# Patient Record
Sex: FEMALE | Race: BLACK OR AFRICAN AMERICAN | NOT HISPANIC OR LATINO
[De-identification: names, ages, dates, MRNs, and addresses within clinical notes are randomized per-mention and may not be internally consistent; named-entity substitution may affect disease eponyms.]

---

## 2023-01-26 ENCOUNTER — APPOINTMENT (OUTPATIENT)
Dept: OBGYN | Facility: CLINIC | Age: 33
End: 2023-01-26
Payer: COMMERCIAL

## 2023-01-26 VITALS — DIASTOLIC BLOOD PRESSURE: 82 MMHG | HEART RATE: 82 BPM | OXYGEN SATURATION: 99 % | SYSTOLIC BLOOD PRESSURE: 127 MMHG

## 2023-01-26 DIAGNOSIS — Z78.9 OTHER SPECIFIED HEALTH STATUS: ICD-10-CM

## 2023-01-26 DIAGNOSIS — Z31.69 ENCOUNTER FOR OTHER GENERAL COUNSELING AND ADVICE ON PROCREATION: ICD-10-CM

## 2023-01-26 PROCEDURE — 99203 OFFICE O/P NEW LOW 30 MIN: CPT

## 2023-01-26 NOTE — PHYSICAL EXAM
[Appropriately responsive] : appropriately responsive [Alert] : alert [No Acute Distress] : no acute distress [No Lymphadenopathy] : no lymphadenopathy [Soft] : soft [Non-tender] : non-tender [Oriented x3] : oriented x3

## 2023-01-26 NOTE — HISTORY OF PRESENT ILLNESS
[Frequency: Q ___ days] : menstrual periods occur approximately every [unfilled] days [Menstrual Cramps] : menstrual cramps [Currently Active] : currently active [Men] : men [No] : No [Patient refuses STI testing] : Patient refuses STI testing [FreeTextEntry1] : 01/26/2023

## 2023-01-27 ENCOUNTER — APPOINTMENT (OUTPATIENT)
Dept: HUMAN REPRODUCTION | Facility: CLINIC | Age: 33
End: 2023-01-27
Payer: COMMERCIAL

## 2023-01-27 PROCEDURE — 36415 COLL VENOUS BLD VENIPUNCTURE: CPT

## 2023-02-02 ENCOUNTER — TRANSCRIPTION ENCOUNTER (OUTPATIENT)
Age: 33
End: 2023-02-02

## 2023-02-02 ENCOUNTER — APPOINTMENT (OUTPATIENT)
Dept: ULTRASOUND IMAGING | Facility: HOSPITAL | Age: 33
End: 2023-02-02

## 2023-02-02 ENCOUNTER — OUTPATIENT (OUTPATIENT)
Dept: OUTPATIENT SERVICES | Facility: HOSPITAL | Age: 33
LOS: 1 days | End: 2023-02-02
Payer: COMMERCIAL

## 2023-02-02 PROCEDURE — 76856 US EXAM PELVIC COMPLETE: CPT | Mod: 26

## 2023-02-02 PROCEDURE — 76830 TRANSVAGINAL US NON-OB: CPT | Mod: 26

## 2023-02-02 PROCEDURE — 76856 US EXAM PELVIC COMPLETE: CPT

## 2023-02-02 PROCEDURE — 76830 TRANSVAGINAL US NON-OB: CPT

## 2023-05-26 ENCOUNTER — APPOINTMENT (OUTPATIENT)
Dept: FAMILY MEDICINE | Facility: CLINIC | Age: 33
End: 2023-05-26
Payer: COMMERCIAL

## 2023-05-26 VITALS
TEMPERATURE: 97 F | BODY MASS INDEX: 28.12 KG/M2 | RESPIRATION RATE: 16 BRPM | WEIGHT: 175 LBS | HEART RATE: 79 BPM | HEIGHT: 66 IN | SYSTOLIC BLOOD PRESSURE: 123 MMHG | OXYGEN SATURATION: 100 % | DIASTOLIC BLOOD PRESSURE: 76 MMHG

## 2023-05-26 DIAGNOSIS — Z86.018 PERSONAL HISTORY OF OTHER BENIGN NEOPLASM: ICD-10-CM

## 2023-05-26 DIAGNOSIS — M25.562 PAIN IN LEFT KNEE: ICD-10-CM

## 2023-05-26 DIAGNOSIS — Z80.3 FAMILY HISTORY OF MALIGNANT NEOPLASM OF BREAST: ICD-10-CM

## 2023-05-26 DIAGNOSIS — N64.4 MASTODYNIA: ICD-10-CM

## 2023-05-26 DIAGNOSIS — Z82.49 FAMILY HISTORY OF ISCHEMIC HEART DISEASE AND OTHER DISEASES OF THE CIRCULATORY SYSTEM: ICD-10-CM

## 2023-05-26 DIAGNOSIS — Z78.9 OTHER SPECIFIED HEALTH STATUS: ICD-10-CM

## 2023-05-26 DIAGNOSIS — D21.9 BENIGN NEOPLASM OF CONNECTIVE AND OTHER SOFT TISSUE, UNSPECIFIED: ICD-10-CM

## 2023-05-26 DIAGNOSIS — E04.9 NONTOXIC GOITER, UNSPECIFIED: ICD-10-CM

## 2023-05-26 DIAGNOSIS — Z00.00 ENCOUNTER FOR GENERAL ADULT MEDICAL EXAMINATION W/OUT ABNORMAL FINDINGS: ICD-10-CM

## 2023-05-26 PROCEDURE — 99385 PREV VISIT NEW AGE 18-39: CPT | Mod: 25

## 2023-05-26 PROCEDURE — 36415 COLL VENOUS BLD VENIPUNCTURE: CPT

## 2023-05-26 NOTE — HEALTH RISK ASSESSMENT
[With Significant Other] : lives with significant other [# of Members in Household ___] :  household currently consist of [unfilled] member(s) [Employed] : employed [] :  [Sexually Active] : sexually active [Feels Safe at Home] : Feels safe at home [Good] : ~his/her~  mood as  good [Yes] : Yes [2 - 4 times a month (2 pts)] : 2-4 times a month (2 points) [1 or 2 (0 pts)] : 1 or 2 (0 points) [Never (0 pts)] : Never (0 points) [No] : In the past 12 months have you used drugs other than those required for medical reasons? No [HIV test declined] : HIV test declined [Hepatitis C test declined] : Hepatitis C test declined [No falls in past year] : Patient reported no falls in the past year [0] : 2) Feeling down, depressed, or hopeless: Not at all (0) [PHQ-2 Negative - No further assessment needed] : PHQ-2 Negative - No further assessment needed [None] : None [Fully functional (bathing, dressing, toileting, transferring, walking, feeding)] : Fully functional (bathing, dressing, toileting, transferring, walking, feeding) [Fully functional (using the telephone, shopping, preparing meals, housekeeping, doing laundry, using] : Fully functional and needs no help or supervision to perform IADLs (using the telephone, shopping, preparing meals, housekeeping, doing laundry, using transportation, managing medications and managing finances) [Never] : Never [Audit-CScore] : 2 [MKZ3Mrylg] : 0 [Change in mental status noted] : No change in mental status noted [PapSmearDate] : 09/22 [FreeTextEntry2] : RN

## 2023-05-26 NOTE — PAST MEDICAL HISTORY
[Menstruating] : menstruating [Approximately ___] : the LMP was approximately [unfilled] [Excessive Bleeding] : there was excessive bleeding [Dysmenorrhea] : dysmenorrhea [Total Preg ___] : G[unfilled] [Live Births ___] : P[unfilled]  [Full Term ___] : Full Term: [unfilled] [Premature ___] : Premature: [unfilled] [Abortions ___] : Abortions:[unfilled] [Living ___] : Living: [unfilled]

## 2023-05-26 NOTE — PLAN
[FreeTextEntry1] : \par \par #HM\par -CMP, CBC, Lipid, A1C, TSH w. rFT4, vitamin B12 level\par -Flu shot annually recommended\par -COVID-19 vaccination series completed\par -COVID19 booster x 1 \par -Tdap q10 years recommended- reportedly up to date \par -Use of sunscreen\par -Physical activity and healthy lifestyle recommended\par \par #Enlarged thyroid\par -US thyroid\par \par #Right breast pain\par -US breasts\par \par #Left knee pain\par -PT\par -Xray of left knee\par -F/u if no improvement in pain with PT\par \par Labs drawn in office

## 2023-05-26 NOTE — REVIEW OF SYSTEMS
[Joint Pain] : joint pain [Negative] : Heme/Lymph [Dysmenorrhea] : dysmenorrhea [Dysuria] : no dysuria [Incontinence] : no incontinence [Nocturia] : no nocturia [Poor Libido] : libido not poor [Hematuria] : no hematuria [Frequency] : no frequency [Vaginal Discharge] : no vaginal discharge [Joint Stiffness] : no joint stiffness [Joint Swelling] : no joint swelling [Muscle Weakness] : no muscle weakness [Back Pain] : no back pain [FreeTextEntry8] : menorrhagia, right breast pain  [FreeTextEntry9] : left knee pain

## 2023-05-26 NOTE — PHYSICAL EXAM
[No Acute Distress] : no acute distress [Well Nourished] : well nourished [Well Developed] : well developed [Well-Appearing] : well-appearing [Normal Sclera/Conjunctiva] : normal sclera/conjunctiva [PERRL] : pupils equal round and reactive to light [EOMI] : extraocular movements intact [Normal Outer Ear/Nose] : the outer ears and nose were normal in appearance [Normal Oropharynx] : the oropharynx was normal [Normal TMs] : both tympanic membranes were normal [No JVD] : no jugular venous distention [No Lymphadenopathy] : no lymphadenopathy [Supple] : supple [No Respiratory Distress] : no respiratory distress  [No Accessory Muscle Use] : no accessory muscle use [Clear to Auscultation] : lungs were clear to auscultation bilaterally [Normal Rate] : normal rate  [Regular Rhythm] : with a regular rhythm [Normal S1, S2] : normal S1 and S2 [No Murmur] : no murmur heard [No Abdominal Bruit] : a ~M bruit was not heard ~T in the abdomen [No Varicosities] : no varicosities [Pedal Pulses Present] : the pedal pulses are present [No Edema] : there was no peripheral edema [No Palpable Aorta] : no palpable aorta [No Extremity Clubbing/Cyanosis] : no extremity clubbing/cyanosis [Normal Appearance] : normal in appearance [No Nipple Discharge] : no nipple discharge [No Axillary Lymphadenopathy] : no axillary lymphadenopathy [Soft] : abdomen soft [Non Tender] : non-tender [Non-distended] : non-distended [No Masses] : no abdominal mass palpated [No HSM] : no HSM [Normal Bowel Sounds] : normal bowel sounds [Normal Supraclavicular Nodes] : no supraclavicular lymphadenopathy [Normal Axillary Nodes] : no axillary lymphadenopathy [Normal Posterior Cervical Nodes] : no posterior cervical lymphadenopathy [Normal Anterior Cervical Nodes] : no anterior cervical lymphadenopathy [No CVA Tenderness] : no CVA  tenderness [No Spinal Tenderness] : no spinal tenderness [No Joint Swelling] : no joint swelling [Grossly Normal Strength/Tone] : grossly normal strength/tone [No Rash] : no rash [Coordination Grossly Intact] : coordination grossly intact [No Focal Deficits] : no focal deficits [Normal Gait] : normal gait [Speech Grossly Normal] : speech grossly normal [Normal Affect] : the affect was normal [Normal Mood] : the mood was normal [Normal Insight/Judgement] : insight and judgment were intact [de-identified] : mildly enlarged thyroid [de-identified] : \par (chaperone offered for exam today, pt refused) dense breasts  [de-identified] : no laxity of left knee and no tenderness with palpation

## 2023-05-26 NOTE — HISTORY OF PRESENT ILLNESS
[FreeTextEntry1] : comprehensive physical exam. \par chronic left knee pain [de-identified] :  34 y/o female presents today for comprehensive physical exam. \par \par Pt also c/o atraumatic left knee pain x 3 months that worsens with physical activity. Pt states pain started after doing lunges.

## 2023-05-29 LAB
ALBUMIN SERPL ELPH-MCNC: 4.7 G/DL
ALP BLD-CCNC: 52 U/L
ALT SERPL-CCNC: 16 U/L
ANION GAP SERPL CALC-SCNC: 12 MMOL/L
AST SERPL-CCNC: 17 U/L
BILIRUB SERPL-MCNC: 0.3 MG/DL
BUN SERPL-MCNC: 12 MG/DL
CALCIUM SERPL-MCNC: 10.4 MG/DL
CHLORIDE SERPL-SCNC: 103 MMOL/L
CHOLEST SERPL-MCNC: 169 MG/DL
CO2 SERPL-SCNC: 25 MMOL/L
CREAT SERPL-MCNC: 0.79 MG/DL
EGFR: 101 ML/MIN/1.73M2
ESTIMATED AVERAGE GLUCOSE: 111 MG/DL
GLUCOSE SERPL-MCNC: 84 MG/DL
HBA1C MFR BLD HPLC: 5.5 %
HDLC SERPL-MCNC: 63 MG/DL
LDLC SERPL CALC-MCNC: 86 MG/DL
NONHDLC SERPL-MCNC: 105 MG/DL
POTASSIUM SERPL-SCNC: 4.7 MMOL/L
PROT SERPL-MCNC: 7.1 G/DL
SODIUM SERPL-SCNC: 140 MMOL/L
TRIGL SERPL-MCNC: 95 MG/DL
TSH SERPL-ACNC: 1.3 UIU/ML
VIT B12 SERPL-MCNC: 1083 PG/ML

## 2023-09-14 ENCOUNTER — APPOINTMENT (OUTPATIENT)
Dept: HUMAN REPRODUCTION | Facility: CLINIC | Age: 33
End: 2023-09-14

## 2023-10-02 ENCOUNTER — APPOINTMENT (OUTPATIENT)
Dept: HUMAN REPRODUCTION | Facility: CLINIC | Age: 33
End: 2023-10-02
Payer: COMMERCIAL

## 2023-10-02 PROCEDURE — 76830 TRANSVAGINAL US NON-OB: CPT

## 2023-10-02 PROCEDURE — 99214 OFFICE O/P EST MOD 30 MIN: CPT | Mod: 25

## 2023-10-02 PROCEDURE — 36415 COLL VENOUS BLD VENIPUNCTURE: CPT

## 2023-10-05 ENCOUNTER — APPOINTMENT (OUTPATIENT)
Dept: RADIOLOGY | Facility: CLINIC | Age: 33
End: 2023-10-05

## 2023-10-05 ENCOUNTER — APPOINTMENT (OUTPATIENT)
Dept: ULTRASOUND IMAGING | Facility: CLINIC | Age: 33
End: 2023-10-05

## 2023-10-30 ENCOUNTER — APPOINTMENT (OUTPATIENT)
Dept: HUMAN REPRODUCTION | Facility: CLINIC | Age: 33
End: 2023-10-30
Payer: COMMERCIAL

## 2023-10-30 PROCEDURE — 36415 COLL VENOUS BLD VENIPUNCTURE: CPT

## 2023-10-31 ENCOUNTER — APPOINTMENT (OUTPATIENT)
Dept: RADIOLOGY | Facility: CLINIC | Age: 33
End: 2023-10-31
Payer: COMMERCIAL

## 2023-10-31 PROCEDURE — 58340 CATHETER FOR HYSTEROGRAPHY: CPT

## 2023-10-31 PROCEDURE — 74740 X-RAY FEMALE GENITAL TRACT: CPT

## 2023-11-09 ENCOUNTER — APPOINTMENT (OUTPATIENT)
Dept: HUMAN REPRODUCTION | Facility: CLINIC | Age: 33
End: 2023-11-09
Payer: COMMERCIAL

## 2023-11-09 PROCEDURE — 76998 US GUIDE INTRAOP: CPT

## 2023-11-09 PROCEDURE — 58558Z: CUSTOM

## 2023-12-07 ENCOUNTER — APPOINTMENT (OUTPATIENT)
Dept: FAMILY MEDICINE | Facility: CLINIC | Age: 33
End: 2023-12-07
Payer: COMMERCIAL

## 2023-12-07 DIAGNOSIS — Z32.01 ENCOUNTER FOR PREGNANCY TEST, RESULT POSITIVE: ICD-10-CM

## 2023-12-07 DIAGNOSIS — R03.0 ELEVATED BLOOD-PRESSURE READING, W/OUT DIAGNOSIS OF HYPERTENSION: ICD-10-CM

## 2023-12-07 PROCEDURE — 99446 NTRPROF PH1/NTRNET/EHR 5-10: CPT

## 2023-12-12 PROBLEM — Z32.01 POSITIVE URINE PREGNANCY TEST: Status: ACTIVE | Noted: 2023-12-09

## 2023-12-12 PROBLEM — R03.0 ELEVATED BP WITHOUT DIAGNOSIS OF HYPERTENSION: Status: ACTIVE | Noted: 2023-12-12

## 2024-01-05 ENCOUNTER — APPOINTMENT (OUTPATIENT)
Dept: OBGYN | Facility: CLINIC | Age: 34
End: 2024-01-05
Payer: COMMERCIAL

## 2024-01-05 VITALS
WEIGHT: 185 LBS | DIASTOLIC BLOOD PRESSURE: 75 MMHG | OXYGEN SATURATION: 100 % | HEART RATE: 85 BPM | SYSTOLIC BLOOD PRESSURE: 148 MMHG | BODY MASS INDEX: 29.86 KG/M2

## 2024-01-05 PROCEDURE — 99202 OFFICE O/P NEW SF 15 MIN: CPT

## 2024-01-05 PROCEDURE — 36415 COLL VENOUS BLD VENIPUNCTURE: CPT

## 2024-01-05 PROCEDURE — 76830 TRANSVAGINAL US NON-OB: CPT

## 2024-01-10 LAB
ABO + RH PNL BLD: NORMAL
ALBUMIN SERPL ELPH-MCNC: 4.2 G/DL
ALP BLD-CCNC: 49 U/L
ALT SERPL-CCNC: 12 U/L
ANION GAP SERPL CALC-SCNC: 12 MMOL/L
AST SERPL-CCNC: 14 U/L
B19V IGG SER QL IA: NEGATIVE
B19V IGG+IGM SER-IMP: NORMAL
B19V IGM FLD-ACNC: NEGATIVE
BACTERIA UR CULT: NORMAL
BILIRUB SERPL-MCNC: 0.3 MG/DL
BLD GP AB SCN SERPL QL: NORMAL
BUN SERPL-MCNC: 8 MG/DL
C TRACH RRNA SPEC QL NAA+PROBE: NOT DETECTED
CALCIUM SERPL-MCNC: 9.9 MG/DL
CANDIDA VAG CYTO: NOT DETECTED
CHLORIDE SERPL-SCNC: 103 MMOL/L
CMV IGG SERPL QL: 0.25 U/ML
CMV IGG SERPL-IMP: NEGATIVE
CMV IGM SERPL QL: <8 AU/ML
CMV IGM SERPL QL: NEGATIVE
CO2 SERPL-SCNC: 22 MMOL/L
CREAT SERPL-MCNC: 0.61 MG/DL
CYTOMEGALOVIRUS ABS IGM: <8 AU/ML
EGFR: 120 ML/MIN/1.73M2
G VAGINALIS+PREV SP MTYP VAG QL MICRO: NOT DETECTED
GLUCOSE SERPL-MCNC: 112 MG/DL
HBV SURFACE AG SER QL: NONREACTIVE
HCV AB SER QL: NONREACTIVE
HCV S/CO RATIO: 0.09 S/CO
HERPES SIMPLEX 1 AND 2 ABS IGM: 1.52 IV
HGB A MFR BLD: 97.4 %
HGB A2 MFR BLD: 2.6 %
HGB FRACT BLD-IMP: NORMAL
HIV1+2 AB SPEC QL IA.RAPID: NONREACTIVE
MEV IGG FLD QL IA: 71.5 AU/ML
MEV IGG+IGM SER-IMP: POSITIVE
MUV AB SER-ACNC: POSITIVE
MUV IGG SER QL IA: 159 AU/ML
N GONORRHOEA RRNA SPEC QL NAA+PROBE: NOT DETECTED
POTASSIUM SERPL-SCNC: 4 MMOL/L
PROT SERPL-MCNC: 6.6 G/DL
RUBV IGG FLD-ACNC: 3.5 INDEX
RUBV IGG SER-IMP: POSITIVE
RUBV IGM FLD-ACNC: <10 AU/ML
SODIUM SERPL-SCNC: 137 MMOL/L
SOURCE AMPLIFICATION: NORMAL
T GONDII AB SER-IMP: NEGATIVE
T GONDII AB SER-IMP: NEGATIVE
T GONDII IGG SER QL: <3 IU/ML
T GONDII IGM SER QL: <3 AU/ML
T PALLIDUM AB SER QL IA: NEGATIVE
T VAGINALIS VAG QL WET PREP: NOT DETECTED
TOXOPLASMA GONDII ABS IGM: <3 AU/ML
TSH SERPL-ACNC: 0.48 UIU/ML
VZV AB TITR SER: POSITIVE
VZV IGG SER IF-ACNC: 657.1 INDEX

## 2024-01-24 ENCOUNTER — APPOINTMENT (OUTPATIENT)
Dept: OBGYN | Facility: CLINIC | Age: 34
End: 2024-01-24
Payer: COMMERCIAL

## 2024-01-24 VITALS
WEIGHT: 184 LBS | HEART RATE: 90 BPM | SYSTOLIC BLOOD PRESSURE: 154 MMHG | OXYGEN SATURATION: 99 % | BODY MASS INDEX: 29.7 KG/M2 | DIASTOLIC BLOOD PRESSURE: 83 MMHG

## 2024-01-24 PROCEDURE — 0502F SUBSEQUENT PRENATAL CARE: CPT

## 2024-02-08 ENCOUNTER — LABORATORY RESULT (OUTPATIENT)
Age: 34
End: 2024-02-08

## 2024-02-08 ENCOUNTER — APPOINTMENT (OUTPATIENT)
Dept: ANTEPARTUM | Facility: CLINIC | Age: 34
End: 2024-02-08
Payer: COMMERCIAL

## 2024-02-08 ENCOUNTER — ASOB RESULT (OUTPATIENT)
Age: 34
End: 2024-02-08

## 2024-02-08 PROCEDURE — 36415 COLL VENOUS BLD VENIPUNCTURE: CPT

## 2024-02-08 PROCEDURE — 76813 OB US NUCHAL MEAS 1 GEST: CPT

## 2024-02-08 PROCEDURE — 93976 VASCULAR STUDY: CPT

## 2024-02-15 ENCOUNTER — NON-APPOINTMENT (OUTPATIENT)
Age: 34
End: 2024-02-15

## 2024-02-15 LAB
AR GENE MUT ANL BLD/T: NORMAL
CFTR MUT TESTED BLD/T: NEGATIVE
FMR1 GENE MUT ANL BLD/T: NORMAL

## 2024-02-19 NOTE — REVIEW OF SYSTEMS
[Fatigue] : fatigue [Poor Appetite] : poor appetite [Vomiting] : no vomiting [Nausea] : nausea [Breast Pain] : breast pain [Negative] : Heme/Lymph

## 2024-02-19 NOTE — HISTORY OF PRESENT ILLNESS
[Patient reported PAP Smear was normal] : Patient reported PAP Smear was normal [Normal Amount/Duration] :  normal amount and duration [No] : Patient does not have concerns regarding sex [Currently Active] : currently active [Men] : men [PapSmeardate] : 2023 [TextBox_31] : as per pt [FreeTextEntry1] : 11/12/2023

## 2024-02-19 NOTE — PROCEDURE
[Cervical Pap Smear] : cervical Pap smear [Liquid Base] : liquid base [GC Chlamydia Culture] : GC Chlamydia Culture [Affirm (Triple Culture)] : Affirm (triple culture) [Tolerated Well] : the patient tolerated the procedure well [No Complications] : there were no complications [Transvaginal OB Sonogram] : Transvaginal OB Sonogram [Intrauterine Pregnancy] : intrauterine pregnancy [Yolk Sac] : yolk sac present [Fetal Heart] : fetal heart present [CRL: ___ (mm)] : CRL - [unfilled]Umm [Date: ___] : Date: [unfilled] [Current GA by Sonogram: ___ (wks)] : Current GA by Sonogram: [unfilled]Uwks [___ day(s)] : [unfilled] days [Transvaginal OB Sonogram WNL] : Transvaginal OB Sonogram WNL [FreeTextEntry1] : (+) FHR @ 158 bpm

## 2024-02-19 NOTE — PHYSICAL EXAM
[Chaperone Present] : A chaperone was present in the examining room during all aspects of the physical examination [Appropriately responsive] : appropriately responsive [Alert] : alert [No Acute Distress] : no acute distress [No Lymphadenopathy] : no lymphadenopathy [No Murmurs] : no murmurs [Regular Rate Rhythm] : regular rate rhythm [Clear to Auscultation B/L] : clear to auscultation bilaterally [Soft] : soft [Non-tender] : non-tender [Non-distended] : non-distended [No HSM] : No HSM [No Lesions] : no lesions [No Mass] : no mass [Oriented x3] : oriented x3 [Examination Of The Breasts] : a normal appearance [Breast Palpation Diffuse Fibrous Tissue Bilateral] : fibrocystic changes [No Discharge] : no discharge [No Masses] : no breast masses were palpable [Labia Minora] : normal [Labia Majora] : normal [Normal] : normal [Enlarged ___ wks] : enlarged [unfilled] ~Uweeks [FreeTextEntry5] : l/c/p [Uterine Adnexae] : normal

## 2024-02-21 ENCOUNTER — APPOINTMENT (OUTPATIENT)
Dept: OBGYN | Facility: CLINIC | Age: 34
End: 2024-02-21
Payer: COMMERCIAL

## 2024-02-21 PROCEDURE — 0502F SUBSEQUENT PRENATAL CARE: CPT

## 2024-02-22 ENCOUNTER — NON-APPOINTMENT (OUTPATIENT)
Age: 34
End: 2024-02-22

## 2024-02-26 LAB
CREAT 24H UR-MCNC: 1.4 G/24 H
CREAT ?TM UR-MCNC: 58 MG/DL
PROT 24H UR-MRATE: 13 MG/DL
PROT ?TM UR-MCNC: 24 HR
PROT UR-MCNC: 315 MG/24 H
SPECIMEN VOL 24H UR: 2425 ML

## 2024-02-27 RX ORDER — LABETALOL HYDROCHLORIDE 100 MG/1
100 TABLET, FILM COATED ORAL
Qty: 180 | Refills: 0 | Status: ACTIVE | COMMUNITY
Start: 2024-02-22 | End: 1900-01-01

## 2024-03-01 ENCOUNTER — NON-APPOINTMENT (OUTPATIENT)
Age: 34
End: 2024-03-01

## 2024-03-08 ENCOUNTER — ASOB RESULT (OUTPATIENT)
Age: 34
End: 2024-03-08

## 2024-03-08 ENCOUNTER — APPOINTMENT (OUTPATIENT)
Dept: ANTEPARTUM | Facility: CLINIC | Age: 34
End: 2024-03-08
Payer: COMMERCIAL

## 2024-03-08 PROCEDURE — 76805 OB US >/= 14 WKS SNGL FETUS: CPT

## 2024-03-08 PROCEDURE — 76817 TRANSVAGINAL US OBSTETRIC: CPT

## 2024-03-13 ENCOUNTER — APPOINTMENT (OUTPATIENT)
Dept: OBGYN | Facility: CLINIC | Age: 34
End: 2024-03-13
Payer: COMMERCIAL

## 2024-03-13 DIAGNOSIS — R00.2 PALPITATIONS: ICD-10-CM

## 2024-03-13 PROCEDURE — 36415 COLL VENOUS BLD VENIPUNCTURE: CPT

## 2024-03-13 PROCEDURE — 0502F SUBSEQUENT PRENATAL CARE: CPT

## 2024-04-03 ENCOUNTER — APPOINTMENT (OUTPATIENT)
Dept: OBGYN | Facility: CLINIC | Age: 34
End: 2024-04-03
Payer: COMMERCIAL

## 2024-04-03 DIAGNOSIS — I10 ESSENTIAL (PRIMARY) HYPERTENSION: ICD-10-CM

## 2024-04-03 LAB
AFP MOM: 1.65
AFP VALUE: 63.4 NG/ML
ALPHA FETOPROTEIN SERUM COMMENT: NORMAL
ALPHA FETOPROTEIN SERUM INTERPRETATION: NORMAL
ALPHA FETOPROTEIN SERUM RESULTS: NORMAL
ALPHA FETOPROTEIN SERUM TEST RESULTS: NORMAL
ANION GAP SERPL CALC-SCNC: 10 MMOL/L
BUN SERPL-MCNC: 7 MG/DL
CALCIUM SERPL-MCNC: 9.1 MG/DL
CHLORIDE SERPL-SCNC: 102 MMOL/L
CO2 SERPL-SCNC: 22 MMOL/L
CREAT SERPL-MCNC: 0.47 MG/DL
EGFR: 128 ML/MIN/1.73M2
GESTATIONAL AGE BASED ON: NORMAL
GESTATIONAL AGE ON COLLECTION DATE: 17.3 WEEKS
GLUCOSE SERPL-MCNC: 89 MG/DL
INSULIN DEP DIABETES: NO
MATERNAL AGE AT EDD AFP: 35.2 YR
MULTIPLE GESTATION: NO
OSBR RISK 1 IN: 3696
POTASSIUM SERPL-SCNC: 3.9 MMOL/L
RACE: NORMAL
SODIUM SERPL-SCNC: 135 MMOL/L
WEIGHT AFP: 187 LBS

## 2024-04-03 PROCEDURE — 0502F SUBSEQUENT PRENATAL CARE: CPT

## 2024-04-03 RX ORDER — LABETALOL HYDROCHLORIDE 200 MG/1
200 TABLET, FILM COATED ORAL 3 TIMES DAILY
Qty: 90 | Refills: 2 | Status: DISCONTINUED | COMMUNITY
Start: 2024-04-03 | End: 2024-04-03

## 2024-04-03 RX ORDER — LABETALOL HYDROCHLORIDE 200 MG/1
200 TABLET, FILM COATED ORAL 3 TIMES DAILY
Qty: 180 | Refills: 2 | Status: ACTIVE | COMMUNITY
Start: 2024-04-03 | End: 1900-01-01

## 2024-04-10 ENCOUNTER — APPOINTMENT (OUTPATIENT)
Dept: ANTEPARTUM | Facility: CLINIC | Age: 34
End: 2024-04-10
Payer: COMMERCIAL

## 2024-04-10 ENCOUNTER — ASOB RESULT (OUTPATIENT)
Age: 34
End: 2024-04-10

## 2024-04-10 PROCEDURE — 76811 OB US DETAILED SNGL FETUS: CPT

## 2024-04-10 PROCEDURE — 76817 TRANSVAGINAL US OBSTETRIC: CPT

## 2024-04-11 ENCOUNTER — NON-APPOINTMENT (OUTPATIENT)
Age: 34
End: 2024-04-11

## 2024-04-17 ENCOUNTER — ASOB RESULT (OUTPATIENT)
Age: 34
End: 2024-04-17

## 2024-04-17 ENCOUNTER — APPOINTMENT (OUTPATIENT)
Dept: ANTEPARTUM | Facility: CLINIC | Age: 34
End: 2024-04-17
Payer: COMMERCIAL

## 2024-04-17 PROCEDURE — 76816 OB US FOLLOW-UP PER FETUS: CPT

## 2024-04-24 ENCOUNTER — OUTPATIENT (OUTPATIENT)
Dept: OUTPATIENT SERVICES | Facility: HOSPITAL | Age: 34
LOS: 1 days | End: 2024-04-24
Payer: COMMERCIAL

## 2024-04-24 VITALS
DIASTOLIC BLOOD PRESSURE: 69 MMHG | SYSTOLIC BLOOD PRESSURE: 142 MMHG | OXYGEN SATURATION: 100 % | TEMPERATURE: 99 F | RESPIRATION RATE: 17 BRPM | HEART RATE: 76 BPM

## 2024-04-24 DIAGNOSIS — O26.899 OTHER SPECIFIED PREGNANCY RELATED CONDITIONS, UNSPECIFIED TRIMESTER: ICD-10-CM

## 2024-04-24 PROCEDURE — 99214 OFFICE O/P EST MOD 30 MIN: CPT

## 2024-04-24 PROCEDURE — 96360 HYDRATION IV INFUSION INIT: CPT

## 2024-04-24 PROCEDURE — 99213 OFFICE O/P EST LOW 20 MIN: CPT | Mod: 25

## 2024-04-24 PROCEDURE — 76815 OB US LIMITED FETUS(S): CPT | Mod: 26

## 2024-04-24 PROCEDURE — 76815 OB US LIMITED FETUS(S): CPT

## 2024-04-24 PROCEDURE — 59025 FETAL NON-STRESS TEST: CPT | Mod: 26

## 2024-04-24 PROCEDURE — 59025 FETAL NON-STRESS TEST: CPT

## 2024-04-24 RX ORDER — SODIUM CHLORIDE 9 MG/ML
1000 INJECTION, SOLUTION INTRAVENOUS ONCE
Refills: 0 | Status: COMPLETED | OUTPATIENT
Start: 2024-04-24 | End: 2024-04-24

## 2024-04-24 RX ORDER — LABETALOL HCL 100 MG
200 TABLET ORAL ONCE
Refills: 0 | Status: COMPLETED | OUTPATIENT
Start: 2024-04-24 | End: 2024-04-24

## 2024-04-24 RX ADMIN — SODIUM CHLORIDE 1000 MILLILITER(S): 9 INJECTION, SOLUTION INTRAVENOUS at 07:30

## 2024-04-24 NOTE — OB PROVIDER TRIAGE NOTE - NSHPPHYSICALEXAM_GEN_ALL_CORE
US: +FH +FM CHARLEE 9 transverse head to maternal L, anterior placenta  VE: c/l/p, Speculum: closed, no fluids   TVUS: cervical length 4.1  GEN NAD A&ox3  ABD; soft nontender no ctx felt   toco: no ctx seen   NST reassuring reactive   LE no edema/pitting US: +FH +FM CHARLEE 9 transverse head to maternal L, anterior placenta  VE: c/l/p, Speculum: closed, no fluids   TVUS: cervical length 4.1  GEN NAD A&ox3  ABD; soft nontender no ctx felt   toco: no ctx seen   NST reassuring reactive   LE no edema/pitting  Lungs clear on auscultation

## 2024-04-24 NOTE — OB PROVIDER TRIAGE NOTE - NSOBPROVIDERNOTE_OBGYN_ALL_OB_FT
32yo  at 23w3d presents complaining of abdominal cramping every 10 minutes since yesterday   -No ctx on monitor. None felt. Pt feeling better after given IL of IVF   -Encouraged hydration and rest after traveling   -FH+ FM+   -Pt given strict return precautions and advised to come back if pt feels any more pain   -Dr Galvan in agreement w/ plan for d/c 32yo  at 23w3d presents complaining of abdominal cramping every 10 minutes since yesterday   -No ctx on monitor. None felt. Pt feeling better after given IL of IVF   -Encouraged hydration and rest after traveling   -FH+ FM+ reassuring Fetal status   -chtn known to us, mild BP in triage, given Labetalol 200 since pt missed her 7am dose. now normotensive.   -Pt given strict return precautions and advised to come back if pt feels any more pain   -Dr Galvan in agreement w/ plan for d/c

## 2024-04-24 NOTE — OB RN TRIAGE NOTE - NSNURSINGINSTR_OBGYN_ALL_OB_FT
Patient discharged to home; patient provided with discharge instructions by CRAIG Sullivan. Patient verbally indicates understanding of discharge instructions; vital signs wnl. patient ambulated off unit in stable condition.

## 2024-04-24 NOTE — OB PROVIDER TRIAGE NOTE - HISTORY OF PRESENT ILLNESS
[id number] 34yo  at 23w3d presents to triage complaining of abdominal cramping every 10 mins x1d.  Patient states [place] was [place] and started to feel abdominal tightening 2d ago and then yesterday she felt cramping every 10 minutes.   Patient states she just landed from  and came straight here.  Patient states throughout her travels shes been trying to hydrate but doesnt know if it was enough. Denies current severe abdominal pain, LOF, VB. +FM     ANTE: anatomy scan wnl. XY.  cHTN started labetalol 200 TID 1st trimester. ASA qd for high risk.     OBHX G1 current  GYN  3 fibroids unk size  PMHX   chtn   PSHX   inguinal hernia repair w/ mesh 01, hysteroscopic polypectomy 14,16,23  MEDS   ASA qd, Labetalol 200 TID   ALL  NKDA     34yo  at 23w3d presents to triage complaining of abdominal cramping every 10 mins x1d.  Patient [place] and started to feel abdominal tightening 2d ago and then yesterday she felt cramping every 10 minutes.   Patient states she just landed from  and came straight here.  Patient states throughout her travels shes been trying to hydrate but doesnt know if it was enough. Denies current severe abdominal pain, LOF, VB. +FM Denies HA, blurred vision, vision changes, RUQ pain, CP, SOB, LE edema.     ANTE: anatomy scan wnl. XY.  cHTN started labetalol 200 TID 1st trimester. ASA qd for high risk.     OBHX G1 current  GYN  3 fibroids unk size  PMHX   chtn   PSHX   inguinal hernia repair w/ mesh 01, hysteroscopic polypectomy 14,16,23  MEDS   ASA qd, Labetalol 200 TID   ALL  NKDA

## 2024-04-24 NOTE — OB RN TRIAGE NOTE - FALL HARM RISK - UNIVERSAL INTERVENTIONS
Bed in lowest position, wheels locked, appropriate side rails in place/Call bell, personal items and telephone in reach/Instruct patient to call for assistance before getting out of bed or chair/Non-slip footwear when patient is out of bed/[place] to call system/Physically safe environment - no spills, clutter or unnecessary equipment/Purposeful Proactive Rounding/Room/bathroom lighting operational, light cord in reach

## 2024-04-25 DIAGNOSIS — O26.892 OTHER SPECIFIED PREGNANCY RELATED CONDITIONS, SECOND TRIMESTER: ICD-10-CM

## 2024-04-25 DIAGNOSIS — O99.891 OTHER SPECIFIED DISEASES AND CONDITIONS COMPLICATING PREGNANCY: ICD-10-CM

## 2024-04-25 DIAGNOSIS — Z3A.23 23 WEEKS GESTATION OF PREGNANCY: ICD-10-CM

## 2024-04-25 DIAGNOSIS — D21.9 BENIGN NEOPLASM OF CONNECTIVE AND OTHER SOFT TISSUE, UNSPECIFIED: ICD-10-CM

## 2024-04-25 DIAGNOSIS — O10.912 UNSPECIFIED PRE-EXISTING HYPERTENSION COMPLICATING PREGNANCY, SECOND TRIMESTER: ICD-10-CM

## 2024-04-25 DIAGNOSIS — R10.9 UNSPECIFIED ABDOMINAL PAIN: ICD-10-CM

## 2024-04-25 PROBLEM — I10 ESSENTIAL (PRIMARY) HYPERTENSION: Chronic | Status: ACTIVE | Noted: 2024-04-24

## 2024-04-26 ENCOUNTER — NON-APPOINTMENT (OUTPATIENT)
Age: 34
End: 2024-04-26

## 2024-04-26 ENCOUNTER — APPOINTMENT (OUTPATIENT)
Dept: OBGYN | Facility: CLINIC | Age: 34
End: 2024-04-26

## 2024-04-26 LAB
CREAT SPEC-SCNC: 67 MG/DL
CREAT/PROT UR: 0.1 RATIO
PROT UR-MCNC: 8 MG/DL

## 2024-04-26 PROCEDURE — 0502F SUBSEQUENT PRENATAL CARE: CPT

## 2024-04-30 ENCOUNTER — NON-APPOINTMENT (OUTPATIENT)
Age: 34
End: 2024-04-30

## 2024-05-06 ENCOUNTER — NON-APPOINTMENT (OUTPATIENT)
Age: 34
End: 2024-05-06

## 2024-05-13 ENCOUNTER — NON-APPOINTMENT (OUTPATIENT)
Age: 34
End: 2024-05-13

## 2024-05-14 ENCOUNTER — LABORATORY RESULT (OUTPATIENT)
Age: 34
End: 2024-05-14

## 2024-05-14 ENCOUNTER — APPOINTMENT (OUTPATIENT)
Dept: OBGYN | Facility: CLINIC | Age: 34
End: 2024-05-14
Payer: COMMERCIAL

## 2024-05-14 DIAGNOSIS — O09.519 SUPERVISION OF ELDERLY PRIMIGRAVIDA, UNSPECIFIED TRIMESTER: ICD-10-CM

## 2024-05-14 PROCEDURE — 36415 COLL VENOUS BLD VENIPUNCTURE: CPT

## 2024-05-15 ENCOUNTER — NON-APPOINTMENT (OUTPATIENT)
Age: 34
End: 2024-05-15

## 2024-05-15 LAB
GLUCOSE 1H P 50 G GLC PO SERPL-MCNC: 114 MG/DL
T PALLIDUM AB SER QL IA: NEGATIVE

## 2024-05-20 ENCOUNTER — NON-APPOINTMENT (OUTPATIENT)
Age: 34
End: 2024-05-20

## 2024-05-28 ENCOUNTER — APPOINTMENT (OUTPATIENT)
Dept: OBGYN | Facility: CLINIC | Age: 34
End: 2024-05-28
Payer: COMMERCIAL

## 2024-05-28 DIAGNOSIS — Z34.90 ENCOUNTER FOR SUPERVISION OF NORMAL PREGNANCY, UNSPECIFIED, UNSPECIFIED TRIMESTER: ICD-10-CM

## 2024-05-28 PROCEDURE — 36415 COLL VENOUS BLD VENIPUNCTURE: CPT

## 2024-05-29 ENCOUNTER — APPOINTMENT (OUTPATIENT)
Dept: OBGYN | Facility: CLINIC | Age: 34
End: 2024-05-29

## 2024-05-29 ENCOUNTER — NON-APPOINTMENT (OUTPATIENT)
Age: 34
End: 2024-05-29

## 2024-05-29 LAB
ALBUMIN SERPL ELPH-MCNC: 3.8 G/DL
ALP BLD-CCNC: 70 U/L
ALT SERPL-CCNC: 18 U/L
AST SERPL-CCNC: 14 U/L
BILIRUB DIRECT SERPL-MCNC: 0 MG/DL
BILIRUB INDIRECT SERPL-MCNC: NORMAL MG/DL
BILIRUB SERPL-MCNC: <0.2 MG/DL
PROT SERPL-MCNC: 6.1 G/DL

## 2024-05-30 ENCOUNTER — NON-APPOINTMENT (OUTPATIENT)
Age: 34
End: 2024-05-30

## 2024-05-30 LAB
APPEARANCE: CLEAR
BILIRUBIN URINE: NEGATIVE
BLOOD URINE: NEGATIVE
COLOR: YELLOW
GLUCOSE QUALITATIVE U: NEGATIVE MG/DL
KETONES URINE: NEGATIVE MG/DL
LEUKOCYTE ESTERASE URINE: ABNORMAL
NITRITE URINE: NEGATIVE
PH URINE: 7
PROTEIN URINE: NEGATIVE MG/DL
SPECIFIC GRAVITY URINE: 1
UROBILINOGEN URINE: 0.2 MG/DL

## 2024-05-31 ENCOUNTER — NON-APPOINTMENT (OUTPATIENT)
Age: 34
End: 2024-05-31

## 2024-05-31 DIAGNOSIS — O26.643 INTRAHEPATIC CHOLESTASIS OF PREGNANCY, THIRD TRIMESTER: ICD-10-CM

## 2024-05-31 LAB — BILE AC SER-MCNC: 27.2 UMOL/L

## 2024-05-31 RX ORDER — URSODIOL 300 MG/1
300 CAPSULE ORAL
Qty: 60 | Refills: 1 | Status: ACTIVE | COMMUNITY
Start: 2024-05-31 | End: 1900-01-01

## 2024-06-03 ENCOUNTER — APPOINTMENT (OUTPATIENT)
Dept: ANTEPARTUM | Facility: CLINIC | Age: 34
End: 2024-06-03
Payer: COMMERCIAL

## 2024-06-03 ENCOUNTER — ASOB RESULT (OUTPATIENT)
Age: 34
End: 2024-06-03

## 2024-06-03 PROCEDURE — 76816 OB US FOLLOW-UP PER FETUS: CPT

## 2024-06-03 PROCEDURE — 76818 FETAL BIOPHYS PROFILE W/NST: CPT

## 2024-06-03 PROCEDURE — 76820 UMBILICAL ARTERY ECHO: CPT | Mod: 59

## 2024-06-05 ENCOUNTER — NON-APPOINTMENT (OUTPATIENT)
Age: 34
End: 2024-06-05

## 2024-06-05 ENCOUNTER — APPOINTMENT (OUTPATIENT)
Dept: OBGYN | Facility: CLINIC | Age: 34
End: 2024-06-05

## 2024-06-05 LAB
HCT VFR BLD CALC: 35.7 %
HGB BLD-MCNC: 11.1 G/DL
MCHC RBC-ENTMCNC: 28.8 PG
MCHC RBC-ENTMCNC: 31.1 GM/DL
MCV RBC AUTO: 92.5 FL
PLATELET # BLD AUTO: 235 K/UL
RBC # BLD: 3.86 M/UL
RBC # FLD: 14.9 %
WBC # FLD AUTO: 8.69 K/UL

## 2024-06-05 PROCEDURE — 0502F SUBSEQUENT PRENATAL CARE: CPT

## 2024-06-09 ENCOUNTER — NON-APPOINTMENT (OUTPATIENT)
Age: 34
End: 2024-06-09

## 2024-06-12 ENCOUNTER — APPOINTMENT (OUTPATIENT)
Dept: ANTEPARTUM | Facility: CLINIC | Age: 34
End: 2024-06-12
Payer: COMMERCIAL

## 2024-06-12 ENCOUNTER — ASOB RESULT (OUTPATIENT)
Age: 34
End: 2024-06-12

## 2024-06-12 PROCEDURE — 76818 FETAL BIOPHYS PROFILE W/NST: CPT

## 2024-06-12 PROCEDURE — 76820 UMBILICAL ARTERY ECHO: CPT

## 2024-06-12 PROCEDURE — 76815 OB US LIMITED FETUS(S): CPT

## 2024-06-14 ENCOUNTER — ASOB RESULT (OUTPATIENT)
Age: 34
End: 2024-06-14

## 2024-06-14 ENCOUNTER — APPOINTMENT (OUTPATIENT)
Dept: ANTEPARTUM | Facility: CLINIC | Age: 34
End: 2024-06-14
Payer: COMMERCIAL

## 2024-06-14 PROCEDURE — 76815 OB US LIMITED FETUS(S): CPT

## 2024-06-14 PROCEDURE — 76819 FETAL BIOPHYS PROFIL W/O NST: CPT

## 2024-06-14 PROCEDURE — 76820 UMBILICAL ARTERY ECHO: CPT

## 2024-06-19 ENCOUNTER — APPOINTMENT (OUTPATIENT)
Dept: OBGYN | Facility: CLINIC | Age: 34
End: 2024-06-19

## 2024-06-19 ENCOUNTER — NON-APPOINTMENT (OUTPATIENT)
Age: 34
End: 2024-06-19

## 2024-06-19 ENCOUNTER — APPOINTMENT (OUTPATIENT)
Dept: ANTEPARTUM | Facility: CLINIC | Age: 34
End: 2024-06-19
Payer: COMMERCIAL

## 2024-06-19 ENCOUNTER — ASOB RESULT (OUTPATIENT)
Age: 34
End: 2024-06-19

## 2024-06-19 DIAGNOSIS — R30.0 DYSURIA: ICD-10-CM

## 2024-06-19 PROCEDURE — 76818 FETAL BIOPHYS PROFILE W/NST: CPT

## 2024-06-19 PROCEDURE — 76816 OB US FOLLOW-UP PER FETUS: CPT

## 2024-06-19 PROCEDURE — 76820 UMBILICAL ARTERY ECHO: CPT | Mod: 59

## 2024-06-25 ENCOUNTER — NON-APPOINTMENT (OUTPATIENT)
Age: 34
End: 2024-06-25

## 2024-06-25 ENCOUNTER — TRANSCRIPTION ENCOUNTER (OUTPATIENT)
Age: 34
End: 2024-06-25

## 2024-06-25 LAB — BACTERIA UR CULT: NORMAL

## 2024-06-26 ENCOUNTER — APPOINTMENT (OUTPATIENT)
Dept: OBGYN | Facility: CLINIC | Age: 34
End: 2024-06-26

## 2024-06-26 ENCOUNTER — APPOINTMENT (OUTPATIENT)
Dept: ANTEPARTUM | Facility: CLINIC | Age: 34
End: 2024-06-26
Payer: COMMERCIAL

## 2024-06-26 ENCOUNTER — ASOB RESULT (OUTPATIENT)
Age: 34
End: 2024-06-26

## 2024-06-26 PROCEDURE — 76820 UMBILICAL ARTERY ECHO: CPT

## 2024-06-26 PROCEDURE — 36415 COLL VENOUS BLD VENIPUNCTURE: CPT

## 2024-06-26 PROCEDURE — 0502F SUBSEQUENT PRENATAL CARE: CPT

## 2024-06-26 PROCEDURE — 76815 OB US LIMITED FETUS(S): CPT

## 2024-06-26 PROCEDURE — 76818 FETAL BIOPHYS PROFILE W/NST: CPT

## 2024-07-01 ENCOUNTER — ASOB RESULT (OUTPATIENT)
Age: 34
End: 2024-07-01

## 2024-07-01 ENCOUNTER — APPOINTMENT (OUTPATIENT)
Dept: ANTEPARTUM | Facility: CLINIC | Age: 34
End: 2024-07-01
Payer: COMMERCIAL

## 2024-07-01 PROCEDURE — 76818 FETAL BIOPHYS PROFILE W/NST: CPT

## 2024-07-01 PROCEDURE — 76816 OB US FOLLOW-UP PER FETUS: CPT

## 2024-07-01 PROCEDURE — 76820 UMBILICAL ARTERY ECHO: CPT | Mod: 59

## 2024-07-08 ENCOUNTER — NON-APPOINTMENT (OUTPATIENT)
Age: 34
End: 2024-07-08

## 2024-07-10 ENCOUNTER — NON-APPOINTMENT (OUTPATIENT)
Age: 34
End: 2024-07-10

## 2024-07-10 ENCOUNTER — ASOB RESULT (OUTPATIENT)
Age: 34
End: 2024-07-10

## 2024-07-10 ENCOUNTER — APPOINTMENT (OUTPATIENT)
Dept: ANTEPARTUM | Facility: CLINIC | Age: 34
End: 2024-07-10
Payer: COMMERCIAL

## 2024-07-10 ENCOUNTER — APPOINTMENT (OUTPATIENT)
Dept: OBGYN | Facility: CLINIC | Age: 34
End: 2024-07-10
Payer: COMMERCIAL

## 2024-07-10 PROCEDURE — 76815 OB US LIMITED FETUS(S): CPT

## 2024-07-10 PROCEDURE — 0502F SUBSEQUENT PRENATAL CARE: CPT

## 2024-07-10 PROCEDURE — 76818 FETAL BIOPHYS PROFILE W/NST: CPT

## 2024-07-10 PROCEDURE — 76820 UMBILICAL ARTERY ECHO: CPT

## 2024-07-12 ENCOUNTER — ASOB RESULT (OUTPATIENT)
Age: 34
End: 2024-07-12

## 2024-07-12 ENCOUNTER — APPOINTMENT (OUTPATIENT)
Dept: ANTEPARTUM | Facility: CLINIC | Age: 34
End: 2024-07-12
Payer: COMMERCIAL

## 2024-07-12 DIAGNOSIS — R03.0 ELEVATED BLOOD-PRESSURE READING, W/OUT DIAGNOSIS OF HYPERTENSION: ICD-10-CM

## 2024-07-12 PROCEDURE — 76820 UMBILICAL ARTERY ECHO: CPT | Mod: 59

## 2024-07-12 PROCEDURE — 76818 FETAL BIOPHYS PROFILE W/NST: CPT

## 2024-07-14 LAB
CREAT 24H UR-MCNC: 0.8 G/24 H
PROT 24H UR-MRATE: 8 MG/DL
PROT ?TM UR-MCNC: 24 HR
PROT UR-MCNC: 198 MG/24 H
SPECIMEN VOL 24H UR: 2475 ML

## 2024-07-15 ENCOUNTER — APPOINTMENT (OUTPATIENT)
Dept: ANTEPARTUM | Facility: CLINIC | Age: 34
End: 2024-07-15
Payer: COMMERCIAL

## 2024-07-15 ENCOUNTER — ASOB RESULT (OUTPATIENT)
Age: 34
End: 2024-07-15

## 2024-07-15 PROCEDURE — 76816 OB US FOLLOW-UP PER FETUS: CPT

## 2024-07-15 PROCEDURE — 76820 UMBILICAL ARTERY ECHO: CPT | Mod: 59

## 2024-07-15 PROCEDURE — 76818 FETAL BIOPHYS PROFILE W/NST: CPT

## 2024-07-17 ENCOUNTER — APPOINTMENT (OUTPATIENT)
Dept: OBGYN | Facility: CLINIC | Age: 34
End: 2024-07-17
Payer: COMMERCIAL

## 2024-07-17 DIAGNOSIS — Z34.90 ENCOUNTER FOR SUPERVISION OF NORMAL PREGNANCY, UNSPECIFIED, UNSPECIFIED TRIMESTER: ICD-10-CM

## 2024-07-17 PROCEDURE — 36415 COLL VENOUS BLD VENIPUNCTURE: CPT

## 2024-07-18 ENCOUNTER — APPOINTMENT (OUTPATIENT)
Dept: ANTEPARTUM | Facility: CLINIC | Age: 34
End: 2024-07-18
Payer: COMMERCIAL

## 2024-07-18 ENCOUNTER — ASOB RESULT (OUTPATIENT)
Age: 34
End: 2024-07-18

## 2024-07-18 PROCEDURE — 76815 OB US LIMITED FETUS(S): CPT

## 2024-07-18 PROCEDURE — 76820 UMBILICAL ARTERY ECHO: CPT

## 2024-07-18 PROCEDURE — 76818 FETAL BIOPHYS PROFILE W/NST: CPT

## 2024-07-19 ENCOUNTER — APPOINTMENT (OUTPATIENT)
Dept: OBGYN | Facility: CLINIC | Age: 34
End: 2024-07-19
Payer: COMMERCIAL

## 2024-07-19 PROCEDURE — 0502F SUBSEQUENT PRENATAL CARE: CPT

## 2024-07-19 PROCEDURE — 36415 COLL VENOUS BLD VENIPUNCTURE: CPT

## 2024-07-22 ENCOUNTER — APPOINTMENT (OUTPATIENT)
Dept: ANTEPARTUM | Facility: CLINIC | Age: 34
End: 2024-07-22
Payer: COMMERCIAL

## 2024-07-22 ENCOUNTER — ASOB RESULT (OUTPATIENT)
Age: 34
End: 2024-07-22

## 2024-07-22 LAB
ALBUMIN SERPL ELPH-MCNC: 3.7 G/DL
ALP BLD-CCNC: 98 U/L
ALT SERPL-CCNC: 18 U/L
AST SERPL-CCNC: 14 U/L
B-HEM STREP SPEC QL CULT: NORMAL
BILE AC SER-MCNC: 19.5 UMOL/L
BILIRUB DIRECT SERPL-MCNC: 0 MG/DL
BILIRUB INDIRECT SERPL-MCNC: NORMAL MG/DL
BILIRUB SERPL-MCNC: <0.2 MG/DL
HBV SURFACE AG SER QL: NONREACTIVE
HCT VFR BLD CALC: 35.3 %
HCV RNA SERPL NAA+PROBE-LOG IU: NOT DETECTED LOGIU/ML
HEPC RNA INTERP: NOT DETECTED
HGB BLD-MCNC: 11.5 G/DL
HIV1+2 AB SPEC QL IA.RAPID: NONREACTIVE
MCHC RBC-ENTMCNC: 28.5 PG
MCHC RBC-ENTMCNC: 32.6 GM/DL
MCV RBC AUTO: 87.6 FL
PLATELET # BLD AUTO: 191 K/UL
PROT SERPL-MCNC: 6.2 G/DL
RBC # BLD: 4.03 M/UL
RBC # FLD: 15 %
T PALLIDUM AB SER QL IA: NEGATIVE
WBC # FLD AUTO: 8.01 K/UL

## 2024-07-22 PROCEDURE — 76818 FETAL BIOPHYS PROFILE W/NST: CPT

## 2024-07-22 PROCEDURE — 76815 OB US LIMITED FETUS(S): CPT

## 2024-07-22 PROCEDURE — 76820 UMBILICAL ARTERY ECHO: CPT

## 2024-07-25 ENCOUNTER — ASOB RESULT (OUTPATIENT)
Age: 34
End: 2024-07-25

## 2024-07-25 ENCOUNTER — APPOINTMENT (OUTPATIENT)
Dept: ANTEPARTUM | Facility: CLINIC | Age: 34
End: 2024-07-25
Payer: COMMERCIAL

## 2024-07-25 PROCEDURE — 76815 OB US LIMITED FETUS(S): CPT

## 2024-07-25 PROCEDURE — 76820 UMBILICAL ARTERY ECHO: CPT

## 2024-07-25 PROCEDURE — 76818 FETAL BIOPHYS PROFILE W/NST: CPT

## 2024-07-26 ENCOUNTER — APPOINTMENT (OUTPATIENT)
Dept: OBGYN | Facility: CLINIC | Age: 34
End: 2024-07-26

## 2024-07-26 PROCEDURE — 0502F SUBSEQUENT PRENATAL CARE: CPT

## 2024-07-28 ENCOUNTER — INPATIENT (INPATIENT)
Facility: HOSPITAL | Age: 34
LOS: 8 days | Discharge: HOME | End: 2024-08-06
Attending: OBSTETRICS & GYNECOLOGY | Admitting: OBSTETRICS & GYNECOLOGY
Payer: COMMERCIAL

## 2024-07-28 VITALS
DIASTOLIC BLOOD PRESSURE: 69 MMHG | HEART RATE: 68 BPM | SYSTOLIC BLOOD PRESSURE: 130 MMHG | TEMPERATURE: 98 F | WEIGHT: 203.05 LBS | HEIGHT: 66 IN | OXYGEN SATURATION: 99 % | RESPIRATION RATE: 17 BRPM

## 2024-07-28 DIAGNOSIS — Z98.890 OTHER SPECIFIED POSTPROCEDURAL STATES: Chronic | ICD-10-CM

## 2024-07-28 LAB
ALBUMIN SERPL ELPH-MCNC: 3.8 G/DL — SIGNIFICANT CHANGE UP (ref 3.3–5)
ALP SERPL-CCNC: 121 U/L — HIGH (ref 40–120)
ALT FLD-CCNC: 18 U/L — SIGNIFICANT CHANGE UP (ref 10–45)
ANION GAP SERPL CALC-SCNC: 8 MMOL/L — SIGNIFICANT CHANGE UP (ref 5–17)
ANISOCYTOSIS BLD QL: SLIGHT — SIGNIFICANT CHANGE UP
AST SERPL-CCNC: 15 U/L — SIGNIFICANT CHANGE UP (ref 10–40)
BASOPHILS # BLD AUTO: 0 K/UL — SIGNIFICANT CHANGE UP (ref 0–0.2)
BASOPHILS NFR BLD AUTO: 0 % — SIGNIFICANT CHANGE UP (ref 0–2)
BILIRUB SERPL-MCNC: <0.2 MG/DL — SIGNIFICANT CHANGE UP (ref 0.2–1.2)
BLD GP AB SCN SERPL QL: NEGATIVE — SIGNIFICANT CHANGE UP
BUN SERPL-MCNC: 7 MG/DL — SIGNIFICANT CHANGE UP (ref 7–23)
BURR CELLS BLD QL SMEAR: PRESENT — SIGNIFICANT CHANGE UP
CALCIUM SERPL-MCNC: 9.6 MG/DL — SIGNIFICANT CHANGE UP (ref 8.4–10.5)
CHLORIDE SERPL-SCNC: 103 MMOL/L — SIGNIFICANT CHANGE UP (ref 96–108)
CO2 SERPL-SCNC: 21 MMOL/L — LOW (ref 22–31)
CREAT ?TM UR-MCNC: 32 MG/DL — SIGNIFICANT CHANGE UP
CREAT SERPL-MCNC: 0.58 MG/DL — SIGNIFICANT CHANGE UP (ref 0.5–1.3)
DACRYOCYTES BLD QL SMEAR: SLIGHT — SIGNIFICANT CHANGE UP
EGFR: 121 ML/MIN/1.73M2 — SIGNIFICANT CHANGE UP
EOSINOPHIL # BLD AUTO: 0.06 K/UL — SIGNIFICANT CHANGE UP (ref 0–0.5)
EOSINOPHIL NFR BLD AUTO: 0.9 % — SIGNIFICANT CHANGE UP (ref 0–6)
FIBRINOGEN PPP-MCNC: 331 MG/DL — SIGNIFICANT CHANGE UP (ref 200–445)
GIANT PLATELETS BLD QL SMEAR: PRESENT — SIGNIFICANT CHANGE UP
GLUCOSE SERPL-MCNC: 78 MG/DL — SIGNIFICANT CHANGE UP (ref 70–99)
HCT VFR BLD CALC: 34.6 % — SIGNIFICANT CHANGE UP (ref 34.5–45)
HGB BLD-MCNC: 11.7 G/DL — SIGNIFICANT CHANGE UP (ref 11.5–15.5)
LDH SERPL L TO P-CCNC: 167 U/L — SIGNIFICANT CHANGE UP (ref 50–242)
LYMPHOCYTES # BLD AUTO: 1.44 K/UL — SIGNIFICANT CHANGE UP (ref 1–3.3)
LYMPHOCYTES # BLD AUTO: 20.4 % — SIGNIFICANT CHANGE UP (ref 13–44)
MANUAL SMEAR VERIFICATION: SIGNIFICANT CHANGE UP
MCHC RBC-ENTMCNC: 28.8 PG — SIGNIFICANT CHANGE UP (ref 27–34)
MCHC RBC-ENTMCNC: 33.8 GM/DL — SIGNIFICANT CHANGE UP (ref 32–36)
MCV RBC AUTO: 85.2 FL — SIGNIFICANT CHANGE UP (ref 80–100)
MICROCYTES BLD QL: SLIGHT — SIGNIFICANT CHANGE UP
MONOCYTES # BLD AUTO: 0.69 K/UL — SIGNIFICANT CHANGE UP (ref 0–0.9)
MONOCYTES NFR BLD AUTO: 9.7 % — SIGNIFICANT CHANGE UP (ref 2–14)
MYELOCYTES NFR BLD: 0.9 % — HIGH (ref 0–0)
NEUTROPHILS # BLD AUTO: 4.82 K/UL — SIGNIFICANT CHANGE UP (ref 1.8–7.4)
NEUTROPHILS NFR BLD AUTO: 68.1 % — SIGNIFICANT CHANGE UP (ref 43–77)
OVALOCYTES BLD QL SMEAR: SLIGHT — SIGNIFICANT CHANGE UP
PLAT MORPH BLD: ABNORMAL
PLATELET # BLD AUTO: 200 K/UL — SIGNIFICANT CHANGE UP (ref 150–400)
POIKILOCYTOSIS BLD QL AUTO: SLIGHT — SIGNIFICANT CHANGE UP
POLYCHROMASIA BLD QL SMEAR: SLIGHT — SIGNIFICANT CHANGE UP
POTASSIUM SERPL-MCNC: 4 MMOL/L — SIGNIFICANT CHANGE UP (ref 3.5–5.3)
POTASSIUM SERPL-SCNC: 4 MMOL/L — SIGNIFICANT CHANGE UP (ref 3.5–5.3)
PROT ?TM UR-MCNC: 4 MG/DL — SIGNIFICANT CHANGE UP (ref 0–12)
PROT SERPL-MCNC: 6.7 G/DL — SIGNIFICANT CHANGE UP (ref 6–8.3)
PROT/CREAT UR-RTO: 0.1 RATIO — SIGNIFICANT CHANGE UP (ref 0–0.2)
RBC # BLD: 4.06 M/UL — SIGNIFICANT CHANGE UP (ref 3.8–5.2)
RBC # FLD: 14.5 % — SIGNIFICANT CHANGE UP (ref 10.3–14.5)
RBC BLD AUTO: ABNORMAL
RH IG SCN BLD-IMP: POSITIVE — SIGNIFICANT CHANGE UP
SODIUM SERPL-SCNC: 132 MMOL/L — LOW (ref 135–145)
SPHEROCYTES BLD QL SMEAR: SLIGHT — SIGNIFICANT CHANGE UP
URATE SERPL-MCNC: 4.2 MG/DL — SIGNIFICANT CHANGE UP (ref 2.5–7)
WBC # BLD: 7.08 K/UL — SIGNIFICANT CHANGE UP (ref 3.8–10.5)
WBC # FLD AUTO: 7.08 K/UL — SIGNIFICANT CHANGE UP (ref 3.8–10.5)

## 2024-07-28 RX ORDER — TRISODIUM CITRATE DIHYDRATE AND CITRIC ACID MONOHYDRATE 500; 334 MG/5ML; MG/5ML
15 SOLUTION ORAL EVERY 6 HOURS
Refills: 0 | Status: DISCONTINUED | OUTPATIENT
Start: 2024-07-28 | End: 2024-07-30

## 2024-07-28 RX ORDER — OXYTOCIN/RINGER'S LACTATE 20/1000 ML
PLASTIC BAG, INJECTION (ML) INTRAVENOUS
Qty: 30 | Refills: 0 | Status: DISCONTINUED | OUTPATIENT
Start: 2024-07-28 | End: 2024-07-30

## 2024-07-28 RX ORDER — METOCLOPRAMIDE HCL 5 MG/ML
10 VIAL (ML) INJECTION EVERY 6 HOURS
Refills: 0 | Status: DISCONTINUED | OUTPATIENT
Start: 2024-07-28 | End: 2024-07-30

## 2024-07-28 RX ORDER — DEXTROSE MONOHYDRATE, SODIUM CHLORIDE, SODIUM LACTATE, CALCIUM CHLORIDE, MAGNESIUM CHLORIDE 1.5; 538; 448; 18.4; 5.08 G/100ML; MG/100ML; MG/100ML; MG/100ML; MG/100ML
1000 SOLUTION INTRAPERITONEAL
Refills: 0 | Status: DISCONTINUED | OUTPATIENT
Start: 2024-07-28 | End: 2024-07-30

## 2024-07-28 RX ORDER — ACETAMINOPHEN 500 MG
975 TABLET ORAL ONCE
Refills: 0 | Status: COMPLETED | OUTPATIENT
Start: 2024-07-28 | End: 2024-07-28

## 2024-07-28 RX ORDER — OXYTOCIN/RINGER'S LACTATE 20/1000 ML
333.33 PLASTIC BAG, INJECTION (ML) INTRAVENOUS
Qty: 20 | Refills: 0 | Status: DISCONTINUED | OUTPATIENT
Start: 2024-07-28 | End: 2024-07-30

## 2024-07-28 RX ORDER — CHLORHEXIDINE GLUCONATE 500 MG/1
1 CLOTH TOPICAL DAILY
Refills: 0 | Status: DISCONTINUED | OUTPATIENT
Start: 2024-07-28 | End: 2024-07-30

## 2024-07-28 RX ADMIN — DEXTROSE MONOHYDRATE, SODIUM CHLORIDE, SODIUM LACTATE, CALCIUM CHLORIDE, MAGNESIUM CHLORIDE 125 MILLILITER(S): 1.5; 538; 448; 18.4; 5.08 SOLUTION INTRAPERITONEAL at 22:55

## 2024-07-28 NOTE — OB PROVIDER H&P - NSLOWPPHRISK_OBGYN_A_OB
No previous uterine incision/Cobb Pregnancy/Less than or equal to 4 previous vaginal births/No known bleeding disorder/No history of postpartum hemorrhage/No other PPH risks indicated

## 2024-07-28 NOTE — OB PROVIDER H&P - ASSESSMENT
35 yo  at 37w0d here for IOL for ICP and cHTN  -Normotensive here with mild headache. Denies o toxic symptoms of PEC  -Full PEC labs ordered  -Tylenol and reglan for h/a. Continue to monitor closely for resolution  -History of SVT. HR wnl  - Admit to L&D  - Consent signed for vaginal delivery and induction of labor  - Plan for cervical balloon and pitocin  - NPO  - IV fluids and labs ordered  - GBS negative  - Continuous EFM     Discussed with Reba Solorio MD PGY3 and attending Waqar Payton, PGY1 35 yo  at 37w0d here for IOL for ICP and cHTN  -Normotensive here with mild headache. Denies o toxic symptoms of PEC  -Full PEC labs ordered  -Tylenol and reglan for h/a. Continue to monitor closely for resolution  -History of SVT. HR wnl  - Admit to L&D  - Consent signed for vaginal delivery and induction of labor  - Plan for cervical balloon and pitocin  - NPO  - IV fluids and labs ordered  - GBS negative  - Continuous EFM     Discussed with Reba Solorio MD PGY3 and attending Dr. Waqar Payton, PGY1

## 2024-07-28 NOTE — OB RN PATIENT PROFILE - FALL HARM RISK - UNIVERSAL INTERVENTIONS
Bed in lowest position, wheels locked, appropriate side rails in place/Call bell, personal items and telephone in reach/Instruct patient to call for assistance before getting out of bed or chair/Non-slip footwear when patient is out of bed/[place] to call system/Physically safe environment - no spills, clutter or unnecessary equipment/Purposeful Proactive Rounding/Room/bathroom lighting operational, light cord in reach

## 2024-07-28 NOTE — OB RN PATIENT PROFILE - NSICDXPASTMEDICALHX_GEN_ALL_CORE_FT
PAST MEDICAL HISTORY:  History of cholestasis during pregnancy     Hypertension     SVT (supraventricular tachycardia)

## 2024-07-28 NOTE — OB RN PATIENT PROFILE - PRO HBSAG INFANT
Well appearing, awake, alert, oriented to person, place, time/situation and in no apparent distress. normal... negative

## 2024-07-28 NOTE — OB PROVIDER H&P - ATTENDING COMMENTS
Agree with resident A/P. Prenatal labs and sonograms reviewed. Closely monitor BPs. Category 1 tracing, agree with IOL with balloon and pitocin.

## 2024-07-28 NOTE — OB RN PATIENT PROFILE - HEIGHT IN INCHES
From: Teddy Eaton  To: Scar Constantino MD  Sent: 6/13/2019  6:40 PM CDT  Subject: Imaging Question    This message is being sent by IFRAH Eaton on behalf of Teddy Constantino,    Please let me know the results of my scan, which was taken  Wednesday at [facility].  I have an appointment  with Samm Campbell the end of the month. When will you  do the out patient surgery?   Ken Eaton  [phone]     6

## 2024-07-28 NOTE — OB PROVIDER H&P - HISTORY OF PRESENT ILLNESS
35yo  at 37w0d presenting for IOL for cHTN and ICP. - LOF - VB - CTX +FM. Reports mild 2/10 frontal headache that started when arriving at the hospital. Denies scotoma, RUQ/epigastric pain, SOB, n/v.    Ante: Spontaneous pregnancy. NIPT and anatomy scan wnl. Passed GCT.   #cHTN: Diagnosed n the first trimester and started on labetalol 100 QD. Due to poorly controlled BPs, labetalol was increased to 400 TID at week 28 and decreased back to 100 TID which is her current regimen. Reports BPs at home to ne 120s/60s. Never had a severe range BP. 24hr urine protein 314  and 200 .   #ICP: Diagnosed at week 26. On ursodiol 200 BID.  bile acids 19.5  #SVT: Diagnosed by holter monitor in the second trimester. Followed with cardio for the rest of the pregnancy. Has been asymptomatic on labetalol.   EFW 3090g  GBS negative    OBHX: G1    GynHX: 3 fibroids 3-4 cm. Denies ovarian cysts, abnormal pap smear, STI/herpes.   MedHX: denies  Surghx: linguinal hernia repair, lap, ; hysteroscopic uterine polypectomy , ,   Medications: labetalol 100 TID, ursodiol 200 BID, baby aspirin 1 tablet daily (last taken yesterday), PNV   Allergies: NKDA    Physical Exam:  T(C): 36.7 (24 @ 21:36), Max: 36.7 (24 @ 21:36)  HR: 68 (24 @ 21:36) (68 - 68)  BP: 130/69 (24 @ 21:36) (130/69 - 130/69)  RR: 17 (24 @ 21:36) (17 - 17)  SpO2: 99% (24 @ 21:36) (99% - 99%)    General: NAD  Pulm: no increased WOB  Abdomen: soft, gravid, nontender  Extremities: wnl     TAUS: Cephalic  VE: FT/long    EFM: 135 bpm, mod variability, + accels, - decels; reactive and reassuring  [place]: Uterine irritability with single contraction

## 2024-07-29 ENCOUNTER — TRANSCRIPTION ENCOUNTER (OUTPATIENT)
Age: 34
End: 2024-07-29

## 2024-07-29 ENCOUNTER — NON-APPOINTMENT (OUTPATIENT)
Age: 34
End: 2024-07-29

## 2024-07-29 LAB — RH IG SCN BLD-IMP: POSITIVE — SIGNIFICANT CHANGE UP

## 2024-07-29 RX ORDER — LABETALOL HCL 200 MG
100 TABLET ORAL THREE TIMES A DAY
Refills: 0 | Status: DISCONTINUED | OUTPATIENT
Start: 2024-07-29 | End: 2024-07-29

## 2024-07-29 RX ORDER — ONDANSETRON HCL/PF 4 MG/2 ML
8 VIAL (ML) INJECTION ONCE
Refills: 0 | Status: COMPLETED | OUTPATIENT
Start: 2024-07-29 | End: 2024-07-29

## 2024-07-29 RX ORDER — LABETALOL HCL 200 MG
100 TABLET ORAL EVERY 8 HOURS
Refills: 0 | Status: DISCONTINUED | OUTPATIENT
Start: 2024-07-29 | End: 2024-07-30

## 2024-07-29 RX ADMIN — DEXTROSE MONOHYDRATE, SODIUM CHLORIDE, SODIUM LACTATE, CALCIUM CHLORIDE, MAGNESIUM CHLORIDE 125 MILLILITER(S): 1.5; 538; 448; 18.4; 5.08 SOLUTION INTRAPERITONEAL at 04:16

## 2024-07-29 RX ADMIN — Medication 250 MILLILITER(S): at 04:13

## 2024-07-29 RX ADMIN — Medication 2 MILLIUNIT(S)/MIN: at 00:22

## 2024-07-29 RX ADMIN — DEXTROSE MONOHYDRATE, SODIUM CHLORIDE, SODIUM LACTATE, CALCIUM CHLORIDE, MAGNESIUM CHLORIDE 125 MILLILITER(S): 1.5; 538; 448; 18.4; 5.08 SOLUTION INTRAPERITONEAL at 07:02

## 2024-07-29 RX ADMIN — Medication 8 MILLIGRAM(S): at 02:34

## 2024-07-29 NOTE — OB PROVIDER LABOR PROGRESS NOTE - ASSESSMENT
- Cat I FHT  - S/p balloon  - Pit at 14mu, cont as tolerated  - Epidural in place  - Dr. Meier in house  - Cont current management
Cat 1 tracing. Pit at 6mu. Patient comfortable. Continue to increase pitocin. Epidural PRN. 
Cat 1 tracing. Pit at 10mu. Patient comfortable with epidural. Repositioned. Continue to monitor. 
Exam unchanged. Pitocin paused (previously at 28mu). Per attending, patient to have  section at 5am for failed IOL, assuming fetal status remains reassuring until then. IUPC removed. Patient comfortable with epidural. Category 1 tracing. Patient consented for  section and expressed understanding of risks and benefits.  
ft/L, balloon placed 80 to tension with speculum exam. Pitocin to start shortly. Patient to notify team when she would like epidural. Continue to monitor.

## 2024-07-29 NOTE — PRE-ANESTHESIA EVALUATION ADULT - NSANTHPMHFT_GEN_ALL_CORE
35yo  at 37w0d presenting for IOL for cHTN and ICP. - LOF - VB - CTX +FM. Reports mild 2/10 frontal headache that started when arriving at the hospital. Denies scotoma, RUQ/epigastric pain, SOB, n/v.    Ante: Spontaneous pregnancy. NIPT and anatomy scan wnl. Passed GCT.   #cHTN: Diagnosed n the first trimester and started on labetalol 100 QD. Due to poorly controlled BPs, labetalol was increased to 400 TID at week 28 and decreased back to 100 TID which is her current regimen. Reports BPs at home to ne 120s/60s. Never had a severe range BP. 24hr urine protein 314  and 200 .   #ICP: Diagnosed at week 26. On ursodiol 200 BID.  bile acids 19.5  #SVT: Diagnosed by holter monitor in the second trimester. Followed with cardio for the rest of the pregnancy. Has been asymptomatic on labetalol.   EFW 3090g  GBS negative    OBHX: G1    GynHX: 3 fibroids 3-4 cm. Denies ovarian cysts, abnormal pap smear, STI/herpes.   MedHX: denies  Surghx: linguinal hernia repair, lap, ; hysteroscopic uterine polypectomy , ,   Medications: labetalol 100 TID, ursodiol 200 BID, baby aspirin 1 tablet daily (last taken yesterday), PNV   Allergies: NKDA

## 2024-07-29 NOTE — OB PROVIDER LABOR PROGRESS NOTE - NS_OBIHIFHRDETAILS_OBGYN_ALL_OB_FT
130 bpm, mod variability, + accels, - decels
130 bpm, mod variability, + accels, - decels
140 bpm, mod variability, + accels, - decels
130 bpm, mod variability, + accels, - decels

## 2024-07-30 ENCOUNTER — RESULT REVIEW (OUTPATIENT)
Age: 34
End: 2024-07-30

## 2024-07-30 ENCOUNTER — NON-APPOINTMENT (OUTPATIENT)
Age: 34
End: 2024-07-30

## 2024-07-30 LAB — T PALLIDUM AB TITR SER: NEGATIVE — SIGNIFICANT CHANGE UP

## 2024-07-30 PROCEDURE — 59515 CESAREAN DELIVERY: CPT

## 2024-07-30 PROCEDURE — 88307 TISSUE EXAM BY PATHOLOGIST: CPT | Mod: 26

## 2024-07-30 PROCEDURE — 88305 TISSUE EXAM BY PATHOLOGIST: CPT | Mod: 26

## 2024-07-30 PROCEDURE — 59510 CESAREAN DELIVERY: CPT

## 2024-07-30 DEVICE — SURGICEL POWDER 3 GRAMS: Type: IMPLANTABLE DEVICE | Status: FUNCTIONAL

## 2024-07-30 DEVICE — INTERCEED 3 X 4": Type: IMPLANTABLE DEVICE | Status: FUNCTIONAL

## 2024-07-30 RX ORDER — CRANBERRY FRUIT EXTRACT 650 MG
1 CAPSULE ORAL DAILY
Refills: 0 | Status: DISCONTINUED | OUTPATIENT
Start: 2024-07-30 | End: 2024-08-06

## 2024-07-30 RX ORDER — ACETAMINOPHEN 500 MG
975 TABLET ORAL
Refills: 0 | Status: DISCONTINUED | OUTPATIENT
Start: 2024-07-30 | End: 2024-08-06

## 2024-07-30 RX ORDER — ONDANSETRON HCL/PF 4 MG/2 ML
8 VIAL (ML) INJECTION ONCE
Refills: 0 | Status: COMPLETED | OUTPATIENT
Start: 2024-07-30 | End: 2024-07-30

## 2024-07-30 RX ORDER — FAMOTIDINE 40 MG/1
20 TABLET, FILM COATED ORAL ONCE
Refills: 0 | Status: DISCONTINUED | OUTPATIENT
Start: 2024-07-30 | End: 2024-07-30

## 2024-07-30 RX ORDER — TRISODIUM CITRATE DIHYDRATE AND CITRIC ACID MONOHYDRATE 500; 334 MG/5ML; MG/5ML
30 SOLUTION ORAL ONCE
Refills: 0 | Status: COMPLETED | OUTPATIENT
Start: 2024-07-30 | End: 2024-07-30

## 2024-07-30 RX ORDER — CLOSTRIDIUM TETANI TOXOID ANTIGEN (FORMALDEHYDE INACTIVATED), CORYNEBACTERIUM DIPHTHERIAE TOXOID ANTIGEN (FORMALDEHYDE INACTIVATED), BORDETELLA PERTUSSIS TOXOID ANTIGEN (GLUTARALDEHYDE INACTIVATED), BORDETELLA PERTUSSIS FILAMENTOUS HEMAGGLUTININ ANTIGEN (FORMALDEHYDE INACTIVATED), BORDETELLA PERTUSSIS PERTACTIN ANTIGEN, AND BORDETELLA PERTUSSIS FIMBRIAE 2/3 ANTIGEN 5; 2; 2.5; 5; 3; 5 [LF]/.5ML; [LF]/.5ML; UG/.5ML; UG/.5ML; UG/.5ML; UG/.5ML
0.5 INJECTION, SUSPENSION INTRAMUSCULAR ONCE
Refills: 0 | Status: DISCONTINUED | OUTPATIENT
Start: 2024-07-30 | End: 2024-08-06

## 2024-07-30 RX ORDER — LABETALOL HCL 200 MG
100 TABLET ORAL EVERY 8 HOURS
Refills: 0 | Status: DISCONTINUED | OUTPATIENT
Start: 2024-07-30 | End: 2024-08-03

## 2024-07-30 RX ORDER — AZITHROMYCIN 250 MG
500 TABLET ORAL ONCE
Refills: 0 | Status: DISCONTINUED | OUTPATIENT
Start: 2024-07-30 | End: 2024-07-30

## 2024-07-30 RX ORDER — FAMOTIDINE 40 MG/1
20 TABLET, FILM COATED ORAL ONCE
Refills: 0 | Status: COMPLETED | OUTPATIENT
Start: 2024-07-30 | End: 2024-07-30

## 2024-07-30 RX ORDER — DEXTROSE MONOHYDRATE, SODIUM CHLORIDE, SODIUM LACTATE, CALCIUM CHLORIDE, MAGNESIUM CHLORIDE 1.5; 538; 448; 18.4; 5.08 G/100ML; MG/100ML; MG/100ML; MG/100ML; MG/100ML
1000 SOLUTION INTRAPERITONEAL
Refills: 0 | Status: DISCONTINUED | OUTPATIENT
Start: 2024-07-30 | End: 2024-07-31

## 2024-07-30 RX ORDER — OXYCODONE HYDROCHLORIDE 30 MG/1
5 TABLET ORAL ONCE
Refills: 0 | Status: DISCONTINUED | OUTPATIENT
Start: 2024-07-30 | End: 2024-08-06

## 2024-07-30 RX ORDER — SIMETHICONE 125 MG/1
80 TABLET, CHEWABLE ORAL EVERY 4 HOURS
Refills: 0 | Status: DISCONTINUED | OUTPATIENT
Start: 2024-07-30 | End: 2024-08-06

## 2024-07-30 RX ORDER — OXYCODONE HYDROCHLORIDE 30 MG/1
5 TABLET ORAL
Refills: 0 | Status: COMPLETED | OUTPATIENT
Start: 2024-07-30 | End: 2024-08-06

## 2024-07-30 RX ORDER — NALOXONE HYDROCHLORIDE 0.4 MG/ML
0.1 INJECTION, SOLUTION INTRAMUSCULAR; INTRAVENOUS; SUBCUTANEOUS
Refills: 0 | Status: DISCONTINUED | OUTPATIENT
Start: 2024-07-30 | End: 2024-07-30

## 2024-07-30 RX ORDER — KETOROLAC TROMETHAMINE 10 MG
30 TABLET ORAL EVERY 6 HOURS
Refills: 0 | Status: DISCONTINUED | OUTPATIENT
Start: 2024-07-30 | End: 2024-07-31

## 2024-07-30 RX ORDER — MAGNESIUM HYDROXIDE 400 MG/5ML
30 SUSPENSION, ORAL (FINAL DOSE FORM) ORAL
Refills: 0 | Status: DISCONTINUED | OUTPATIENT
Start: 2024-07-30 | End: 2024-08-06

## 2024-07-30 RX ORDER — ENOXAPARIN SODIUM 120 MG/.8ML
40 INJECTION SUBCUTANEOUS EVERY 24 HOURS
Refills: 0 | Status: DISCONTINUED | OUTPATIENT
Start: 2024-07-30 | End: 2024-08-06

## 2024-07-30 RX ORDER — ACETAMINOPHEN 500 MG
1000 TABLET ORAL ONCE
Refills: 0 | Status: COMPLETED | OUTPATIENT
Start: 2024-07-30 | End: 2024-07-30

## 2024-07-30 RX ORDER — IBUPROFEN 200 MG
600 TABLET ORAL EVERY 6 HOURS
Refills: 0 | Status: COMPLETED | OUTPATIENT
Start: 2024-07-30 | End: 2025-06-28

## 2024-07-30 RX ORDER — LANOLIN 100 %
1 OINTMENT (GRAM) TOPICAL EVERY 6 HOURS
Refills: 0 | Status: DISCONTINUED | OUTPATIENT
Start: 2024-07-30 | End: 2024-08-06

## 2024-07-30 RX ORDER — DIPHENHYDRAMINE HCL 25 MG
25 CAPSULE ORAL EVERY 6 HOURS
Refills: 0 | Status: DISCONTINUED | OUTPATIENT
Start: 2024-07-30 | End: 2024-08-06

## 2024-07-30 RX ORDER — DEXAMETHASONE 1.5 MG/1
4 TABLET ORAL EVERY 6 HOURS
Refills: 0 | Status: DISCONTINUED | OUTPATIENT
Start: 2024-07-30 | End: 2024-07-30

## 2024-07-30 RX ORDER — ONDANSETRON HCL/PF 4 MG/2 ML
4 VIAL (ML) INJECTION EVERY 6 HOURS
Refills: 0 | Status: DISCONTINUED | OUTPATIENT
Start: 2024-07-30 | End: 2024-07-30

## 2024-07-30 RX ORDER — LORATADINE 10 MG
17 TABLET,DISINTEGRATING ORAL ONCE
Refills: 0 | Status: COMPLETED | OUTPATIENT
Start: 2024-07-30 | End: 2024-07-30

## 2024-07-30 RX ORDER — CEFAZOLIN SODIUM 10 G
2000 VIAL (EA) INJECTION ONCE
Refills: 0 | Status: COMPLETED | OUTPATIENT
Start: 2024-07-30 | End: 2024-07-30

## 2024-07-30 RX ORDER — OXYTOCIN/RINGER'S LACTATE 20/1000 ML
333.33 PLASTIC BAG, INJECTION (ML) INTRAVENOUS
Qty: 20 | Refills: 0 | Status: DISCONTINUED | OUTPATIENT
Start: 2024-07-30 | End: 2024-08-06

## 2024-07-30 RX ADMIN — Medication 975 MILLIGRAM(S): at 14:38

## 2024-07-30 RX ADMIN — Medication 100 MILLIGRAM(S): at 09:38

## 2024-07-30 RX ADMIN — TRISODIUM CITRATE DIHYDRATE AND CITRIC ACID MONOHYDRATE 30 MILLILITER(S): 500; 334 SOLUTION ORAL at 05:17

## 2024-07-30 RX ADMIN — Medication 30 MILLIGRAM(S): at 11:31

## 2024-07-30 RX ADMIN — Medication 30 MILLIGRAM(S): at 18:04

## 2024-07-30 RX ADMIN — FAMOTIDINE 20 MILLIGRAM(S): 40 TABLET, FILM COATED ORAL at 05:18

## 2024-07-30 RX ADMIN — Medication 17 GRAM(S): at 22:11

## 2024-07-30 RX ADMIN — Medication 100 MILLIGRAM(S): at 05:18

## 2024-07-30 RX ADMIN — Medication 400 MILLIGRAM(S): at 08:26

## 2024-07-30 RX ADMIN — SIMETHICONE 80 MILLIGRAM(S): 125 TABLET, CHEWABLE ORAL at 21:59

## 2024-07-30 RX ADMIN — Medication 8 MILLIGRAM(S): at 11:51

## 2024-07-30 RX ADMIN — Medication 975 MILLIGRAM(S): at 21:59

## 2024-07-30 RX ADMIN — Medication 100 MILLIGRAM(S): at 18:09

## 2024-07-30 RX ADMIN — Medication 1000 MILLIGRAM(S): at 08:56

## 2024-07-30 RX ADMIN — ENOXAPARIN SODIUM 40 MILLIGRAM(S): 120 INJECTION SUBCUTANEOUS at 22:02

## 2024-07-30 NOTE — OB PROVIDER DELIVERY SUMMARY - NSSELHIDDEN_OBGYN_ALL_OB_FT
[NS_DeliveryAttending1_OBGYN_ALL_OB_FT:[id number]],[NS_DeliveryAssist1_OBGYN_ALL_OB_FT:[id number]]

## 2024-07-30 NOTE — CHART NOTE - NSCHARTNOTEFT_GEN_A_CORE
MD called by RN to RR  for left deviated fundus and UOP 25 cc/h    Upon inspection, fundus is midline/left, firm, under umbilicus. Patient UOP was remeasured as 50 cc/h. UOP adequate, no clinical s/sx of atony, and VSS. Clinical status stable.     Continue to be evaluated in RR until transfer to postop floor.

## 2024-07-30 NOTE — OB PROVIDER DELIVERY SUMMARY - NSLOWPPHRISK_OBGYN_A_OB
No previous uterine incision/Cobb Pregnancy/Less than or equal to 4 previous vaginal births/No known bleeding disorder/No history of postpartum hemorrhage

## 2024-07-30 NOTE — OB RN INTRAOPERATIVE NOTE - NSSELHIDDEN_OBGYN_ALL_OB_FT
[NS_DeliveryAttending1_OBGYN_ALL_OB_FT:[id number]],[NS_DeliveryAssist1_OBGYN_ALL_OB_FT:[id number]],[NS_DeliveryRN_OBGYN_ALL_OB_FT:[id number]]

## 2024-07-30 NOTE — OB RN DELIVERY SUMMARY - NS_SEPSISRSKCALC_OBGYN_ALL_OB_FT
EOS calculated successfully. EOS Risk Factor: 0.5/1000 live births ([facility] national incidence); GA=37w2d; Temp=98.6; ROM=19.083; GBS='Negative'; Antibiotics='No antibiotics or any antibiotics < 2 hrs prior to birth'

## 2024-07-30 NOTE — OB PROVIDER DELIVERY SUMMARY - NSPROVIDERDELIVERYNOTE_OBGYN_ALL_OB_FT
Pt 33yo  at 37w1d presents for primary LTCS after failed IOL for ICP and chronic HTN   delivered in cephalic presentation Pt 33yo  at 37w1d presents for primary LTCS after failed IOL for ICP and chronic HTN  TXA given at skin incision   delivered in cephalic presentation, difficulty with delivery secondary to two intramural/subserosal fibroids (5cm and 6cm) along lower uterine segment. Delivery facilitated with Kiwi vaccuum with good effect, no popoffs  Myomectomy performed on two JASEN fibroids, defects closed with 2-0 vicryl with good effect  Hysterotomy closed in two layers with 1 vicryl  Fascia closed with 0 vicryl  Subcutaneous layer closed 2-0 vicryl  Skin closed with 3-0 monocryl  Intercede and Surgicell powder applied to hysterotomy  Good hemostasis  EBL 1000, IVF 1250,   No complications  Please see dictation Pt 35yo  at 37w1d presents for primary LTCS after failed IOL for ICP and chronic HTN  TXA given at skin incision   delivered in cephalic presentation, difficulty with delivery secondary to two intramural/subserosal fibroids (5cm and 6cm) along lower uterine segment. Delivery facilitated with Kiwi vaccuum with good effect, no popoffs  Myomectomy performed on two JASEN fibroids, defects closed with 2-0 vicryl  Hysterotomy closed in two layers with 1 vicryl  Intercede and Surgicell powder applied to hysterotomy  Good hemostasis  Fascia closed with 0 vicryl  Subcutaneous layer closed 2-0 vicryl  Skin closed with 3-0 monocryl  EBL 1000, IVF 1250,   No complications  Please see dictation

## 2024-07-30 NOTE — OB RN DELIVERY SUMMARY - NSSELHIDDEN_OBGYN_ALL_OB_FT
[NS_DeliveryAttending1_OBGYN_ALL_OB_FT:[id number]],[NS_DeliveryAssist1_OBGYN_ALL_OB_FT:[id number]],[NS_DeliveryRN_OBGYN_ALL_OB_FT:[id number]]

## 2024-07-30 NOTE — LACTATION INITIAL EVALUATION - LACTATION INTERVENTIONS
initiate/review safe skin-to-skin/initiate/review hand expression/initiate/review techniques for position and latch/post discharge community resources provided/initiate/review supplementation plan due to medical indications/initiate/review finger suck/initiate/review breast massage/compression/initiate/review alternate feeding method/reviewed components of an effective feeding and at least 8 effective feedings per day required/reviewed importance of monitoring infant diapers, the breastfeeding log, and minimum output each day/reviewed risks of unnecessary formula supplementation/reviewed benefits and recommendations for rooming in/reviewed feeding on demand/by cue at least 8 times a day/reviewed indications of inadequate milk transfer that would require supplementation

## 2024-07-31 LAB
ALBUMIN SERPL ELPH-MCNC: 3.1 G/DL — LOW (ref 3.3–5)
ALP SERPL-CCNC: 103 U/L — SIGNIFICANT CHANGE UP (ref 40–120)
ALT FLD-CCNC: 17 U/L — SIGNIFICANT CHANGE UP (ref 10–45)
ANION GAP SERPL CALC-SCNC: 8 MMOL/L — SIGNIFICANT CHANGE UP (ref 5–17)
ANISOCYTOSIS BLD QL: SLIGHT — SIGNIFICANT CHANGE UP
ANISOCYTOSIS BLD QL: SLIGHT — SIGNIFICANT CHANGE UP
AST SERPL-CCNC: 20 U/L — SIGNIFICANT CHANGE UP (ref 10–40)
BASOPHILS # BLD AUTO: 0 K/UL — SIGNIFICANT CHANGE UP (ref 0–0.2)
BASOPHILS # BLD AUTO: 0.13 K/UL — SIGNIFICANT CHANGE UP (ref 0–0.2)
BASOPHILS NFR BLD AUTO: 0 % — SIGNIFICANT CHANGE UP (ref 0–2)
BASOPHILS NFR BLD AUTO: 0.9 % — SIGNIFICANT CHANGE UP (ref 0–2)
BILIRUB SERPL-MCNC: <0.2 MG/DL — SIGNIFICANT CHANGE UP (ref 0.2–1.2)
BUN SERPL-MCNC: 8 MG/DL — SIGNIFICANT CHANGE UP (ref 7–23)
BURR CELLS BLD QL SMEAR: PRESENT — SIGNIFICANT CHANGE UP
BURR CELLS BLD QL SMEAR: PRESENT — SIGNIFICANT CHANGE UP
CALCIUM SERPL-MCNC: 9.2 MG/DL — SIGNIFICANT CHANGE UP (ref 8.4–10.5)
CHLORIDE SERPL-SCNC: 103 MMOL/L — SIGNIFICANT CHANGE UP (ref 96–108)
CO2 SERPL-SCNC: 24 MMOL/L — SIGNIFICANT CHANGE UP (ref 22–31)
CREAT SERPL-MCNC: 0.66 MG/DL — SIGNIFICANT CHANGE UP (ref 0.5–1.3)
DACRYOCYTES BLD QL SMEAR: SLIGHT — SIGNIFICANT CHANGE UP
DACRYOCYTES BLD QL SMEAR: SLIGHT — SIGNIFICANT CHANGE UP
EGFR: 117 ML/MIN/1.73M2 — SIGNIFICANT CHANGE UP
EOSINOPHIL # BLD AUTO: 0 K/UL — SIGNIFICANT CHANGE UP (ref 0–0.5)
EOSINOPHIL # BLD AUTO: 0.13 K/UL — SIGNIFICANT CHANGE UP (ref 0–0.5)
EOSINOPHIL NFR BLD AUTO: 0 % — SIGNIFICANT CHANGE UP (ref 0–6)
EOSINOPHIL NFR BLD AUTO: 0.9 % — SIGNIFICANT CHANGE UP (ref 0–6)
FIBRINOGEN PPP-MCNC: 611 MG/DL — HIGH (ref 200–445)
GIANT PLATELETS BLD QL SMEAR: PRESENT — SIGNIFICANT CHANGE UP
GIANT PLATELETS BLD QL SMEAR: PRESENT — SIGNIFICANT CHANGE UP
GLUCOSE SERPL-MCNC: 89 MG/DL — SIGNIFICANT CHANGE UP (ref 70–99)
HCT VFR BLD CALC: 29.9 % — LOW (ref 34.5–45)
HCT VFR BLD CALC: 31.4 % — LOW (ref 34.5–45)
HGB BLD-MCNC: 10 G/DL — LOW (ref 11.5–15.5)
HGB BLD-MCNC: 10.6 G/DL — LOW (ref 11.5–15.5)
LDH SERPL L TO P-CCNC: 209 U/L — SIGNIFICANT CHANGE UP (ref 50–242)
LYMPHOCYTES # BLD AUTO: 0.52 K/UL — LOW (ref 1–3.3)
LYMPHOCYTES # BLD AUTO: 1.19 K/UL — SIGNIFICANT CHANGE UP (ref 1–3.3)
LYMPHOCYTES # BLD AUTO: 3.5 % — LOW (ref 13–44)
LYMPHOCYTES # BLD AUTO: 9.8 % — LOW (ref 13–44)
MACROCYTES BLD QL: SLIGHT — SIGNIFICANT CHANGE UP
MACROCYTES BLD QL: SLIGHT — SIGNIFICANT CHANGE UP
MANUAL SMEAR VERIFICATION: SIGNIFICANT CHANGE UP
MANUAL SMEAR VERIFICATION: SIGNIFICANT CHANGE UP
MCHC RBC-ENTMCNC: 28.9 PG — SIGNIFICANT CHANGE UP (ref 27–34)
MCHC RBC-ENTMCNC: 28.9 PG — SIGNIFICANT CHANGE UP (ref 27–34)
MCHC RBC-ENTMCNC: 33.4 GM/DL — SIGNIFICANT CHANGE UP (ref 32–36)
MCHC RBC-ENTMCNC: 33.8 GM/DL — SIGNIFICANT CHANGE UP (ref 32–36)
MCV RBC AUTO: 85.6 FL — SIGNIFICANT CHANGE UP (ref 80–100)
MCV RBC AUTO: 86.4 FL — SIGNIFICANT CHANGE UP (ref 80–100)
MICROCYTES BLD QL: SLIGHT — SIGNIFICANT CHANGE UP
MICROCYTES BLD QL: SLIGHT — SIGNIFICANT CHANGE UP
MONOCYTES # BLD AUTO: 0.55 K/UL — SIGNIFICANT CHANGE UP (ref 0–0.9)
MONOCYTES # BLD AUTO: 0.66 K/UL — SIGNIFICANT CHANGE UP (ref 0–0.9)
MONOCYTES NFR BLD AUTO: 4.4 % — SIGNIFICANT CHANGE UP (ref 2–14)
MONOCYTES NFR BLD AUTO: 4.5 % — SIGNIFICANT CHANGE UP (ref 2–14)
MYELOCYTES NFR BLD: 0.9 % — HIGH (ref 0–0)
MYELOCYTES NFR BLD: 0.9 % — HIGH (ref 0–0)
NEUTROPHILS # BLD AUTO: 10.29 K/UL — HIGH (ref 1.8–7.4)
NEUTROPHILS # BLD AUTO: 13.34 K/UL — HIGH (ref 1.8–7.4)
NEUTROPHILS NFR BLD AUTO: 80.3 % — HIGH (ref 43–77)
NEUTROPHILS NFR BLD AUTO: 88.5 % — HIGH (ref 43–77)
NEUTS BAND # BLD: 0.9 % — SIGNIFICANT CHANGE UP (ref 0–8)
NEUTS BAND # BLD: 4.5 % — SIGNIFICANT CHANGE UP (ref 0–8)
OVALOCYTES BLD QL SMEAR: SLIGHT — SIGNIFICANT CHANGE UP
OVALOCYTES BLD QL SMEAR: SLIGHT — SIGNIFICANT CHANGE UP
PLAT MORPH BLD: ABNORMAL
PLAT MORPH BLD: ABNORMAL
PLATELET # BLD AUTO: 168 K/UL — SIGNIFICANT CHANGE UP (ref 150–400)
PLATELET # BLD AUTO: 182 K/UL — SIGNIFICANT CHANGE UP (ref 150–400)
POIKILOCYTOSIS BLD QL AUTO: SIGNIFICANT CHANGE UP
POIKILOCYTOSIS BLD QL AUTO: SLIGHT — SIGNIFICANT CHANGE UP
POLYCHROMASIA BLD QL SMEAR: SLIGHT — SIGNIFICANT CHANGE UP
POLYCHROMASIA BLD QL SMEAR: SLIGHT — SIGNIFICANT CHANGE UP
POTASSIUM SERPL-MCNC: 4.2 MMOL/L — SIGNIFICANT CHANGE UP (ref 3.5–5.3)
POTASSIUM SERPL-SCNC: 4.2 MMOL/L — SIGNIFICANT CHANGE UP (ref 3.5–5.3)
PROT SERPL-MCNC: 6 G/DL — SIGNIFICANT CHANGE UP (ref 6–8.3)
RBC # BLD: 3.46 M/UL — LOW (ref 3.8–5.2)
RBC # BLD: 3.67 M/UL — LOW (ref 3.8–5.2)
RBC # FLD: 14.4 % — SIGNIFICANT CHANGE UP (ref 10.3–14.5)
RBC # FLD: 14.7 % — HIGH (ref 10.3–14.5)
RBC BLD AUTO: ABNORMAL
RBC BLD AUTO: ABNORMAL
SCHISTOCYTES BLD QL AUTO: SLIGHT — SIGNIFICANT CHANGE UP
SMUDGE CELLS # BLD: PRESENT — SIGNIFICANT CHANGE UP
SODIUM SERPL-SCNC: 135 MMOL/L — SIGNIFICANT CHANGE UP (ref 135–145)
SPHEROCYTES BLD QL SMEAR: SLIGHT — SIGNIFICANT CHANGE UP
SPHEROCYTES BLD QL SMEAR: SLIGHT — SIGNIFICANT CHANGE UP
URATE SERPL-MCNC: 4.6 MG/DL — SIGNIFICANT CHANGE UP (ref 2.5–7)
WBC # BLD: 12.13 K/UL — HIGH (ref 3.8–10.5)
WBC # BLD: 14.92 K/UL — HIGH (ref 3.8–10.5)
WBC # FLD AUTO: 12.13 K/UL — HIGH (ref 3.8–10.5)
WBC # FLD AUTO: 14.92 K/UL — HIGH (ref 3.8–10.5)

## 2024-07-31 PROCEDURE — 74019 RADEX ABDOMEN 2 VIEWS: CPT | Mod: 26

## 2024-07-31 RX ORDER — DEXTROSE MONOHYDRATE, SODIUM CHLORIDE, SODIUM LACTATE, CALCIUM CHLORIDE, MAGNESIUM CHLORIDE 1.5; 538; 448; 18.4; 5.08 G/100ML; MG/100ML; MG/100ML; MG/100ML; MG/100ML
1000 SOLUTION INTRAPERITONEAL
Refills: 0 | Status: DISCONTINUED | OUTPATIENT
Start: 2024-07-31 | End: 2024-08-03

## 2024-07-31 RX ORDER — PETROLATUM 87.32 G/118G
1 OINTMENT TOPICAL
Refills: 0 | Status: DISCONTINUED | OUTPATIENT
Start: 2024-07-31 | End: 2024-08-06

## 2024-07-31 RX ORDER — IBUPROFEN 200 MG
600 TABLET ORAL EVERY 6 HOURS
Refills: 0 | Status: DISCONTINUED | OUTPATIENT
Start: 2024-07-31 | End: 2024-08-01

## 2024-07-31 RX ORDER — OXYCODONE HYDROCHLORIDE 30 MG/1
5 TABLET ORAL
Refills: 0 | Status: DISCONTINUED | OUTPATIENT
Start: 2024-07-31 | End: 2024-08-01

## 2024-07-31 RX ADMIN — DEXTROSE MONOHYDRATE, SODIUM CHLORIDE, SODIUM LACTATE, CALCIUM CHLORIDE, MAGNESIUM CHLORIDE 125 MILLILITER(S): 1.5; 538; 448; 18.4; 5.08 SOLUTION INTRAPERITONEAL at 19:26

## 2024-07-31 RX ADMIN — Medication 30 MILLIGRAM(S): at 05:47

## 2024-07-31 RX ADMIN — Medication 975 MILLIGRAM(S): at 04:12

## 2024-07-31 RX ADMIN — Medication 975 MILLIGRAM(S): at 21:10

## 2024-07-31 RX ADMIN — Medication 600 MILLIGRAM(S): at 17:38

## 2024-07-31 RX ADMIN — Medication 30 MILLILITER(S): at 18:16

## 2024-07-31 RX ADMIN — Medication 975 MILLIGRAM(S): at 09:17

## 2024-07-31 RX ADMIN — ENOXAPARIN SODIUM 40 MILLIGRAM(S): 120 INJECTION SUBCUTANEOUS at 21:10

## 2024-07-31 RX ADMIN — SIMETHICONE 80 MILLIGRAM(S): 125 TABLET, CHEWABLE ORAL at 17:37

## 2024-07-31 RX ADMIN — Medication 30 MILLIGRAM(S): at 00:10

## 2024-07-31 RX ADMIN — Medication 600 MILLIGRAM(S): at 11:36

## 2024-07-31 RX ADMIN — OXYCODONE HYDROCHLORIDE 5 MILLIGRAM(S): 30 TABLET ORAL at 16:47

## 2024-07-31 RX ADMIN — Medication 975 MILLIGRAM(S): at 15:57

## 2024-07-31 RX ADMIN — OXYCODONE HYDROCHLORIDE 5 MILLIGRAM(S): 30 TABLET ORAL at 22:32

## 2024-07-31 RX ADMIN — Medication 1 TABLET(S): at 11:37

## 2024-07-31 RX ADMIN — Medication 100 MILLIGRAM(S): at 17:47

## 2024-07-31 RX ADMIN — PETROLATUM 1 APPLICATION(S): 87.32 OINTMENT TOPICAL at 16:46

## 2024-07-31 RX ADMIN — SIMETHICONE 80 MILLIGRAM(S): 125 TABLET, CHEWABLE ORAL at 11:37

## 2024-07-31 NOTE — OB POSTPARTUM EVENT NOTE - NS_EVENTSUMMARY1_OBGYN_ALL_OB_FT
MD at bedside for nurse report of abdominal pain 10/10. Pt is pod1 pc/s and myomectomy cb cHTN and ICP ebl 1000.   Patient states pain began this morning and has increased since then now a 10/10. Patient states she has SOB/feels pressure on her chest. Pt denies HA, lightheadedness, vision changes, N/V. Pt has not received labetalol during shift today due to soft pressures. She has been walking and urinating regularly, denies passing flatus and bowel movement. AM Hgb 10.     /71 HR 98 O2 99%   Gen: distress, crying  CV: NRR S1 S2  Resp: CTA b/l patient cannot take deep breath  Abd: epigastric tenderness, RUQ tenderness 10/10 painful with deep inspiration. Pt guarding RUQ/RLQ however more severe on RUQ. No rebound tenderness. Fundus firm under umbilicus.   Ext: no LE edema/tenderness    33 yo POD1 pC/S and myomectomy EBL 1000 c/b cHTN and ICP, now presenting with severe 10/10 R sided abdominal pain. At this time, differential includes siPECwSF vs acute intraabdominal bleed vs postop pain.   Plan  - HTN labs drawn, to f/u   - pts normotensive today, satting well on RA  - oxy 5 mg for acute 10/10 pain     d/w Pino PGY3 and Shira attending MD at bedside for nurse report of abdominal pain 10/10. Pt is pod1 pc/s and myomectomy cb cHTN and ICP ebl 1000.   Patient states pain began this morning and has increased since then now a 10/10. Patient states she has SOB/feels pressure on her chest. Pt denies HA, lightheadedness, vision changes, N/V. Pt has not received labetalol during shift today due to soft pressures. She has been walking and urinating regularly, denies passing flatus and bowel movement. AM Hgb 10.     /71 HR 98 O2 99%   Gen: distress, crying  CV: NRR S1 S2  Resp: CTA b/l patient cannot take deep breath  Abd: epigastric tenderness, RUQ tenderness 10/10 painful with deep inspiration. Pt guarding RUQ/RLQ however more severe on RUQ. No rebound tenderness. Fundus firm under umbilicus.   Ext: no LE edema/tenderness    35 yo POD1 pC/S and myomectomy EBL 1000 c/b cHTN and ICP, now presenting with severe 10/10 R sided abdominal pain. At this time, differential includes siPECwSF, bleeding, and ileus. Will continue to monitor closely.   Plan  - HTN labs drawn, to f/u   - pts normotensive today, satting well on RA  - oxy 5 mg for acute 10/10 pain     d/w Pino PGY3 and Shira attending

## 2024-07-31 NOTE — PROGRESS NOTE ADULT - ASSESSMENT
34y Female POD#1 s/p primary  section for failed IOL for chronic HTN and ICP, uncomplicated                                     - Neuro/Pain:  toradol atc, tylenol atc, oxy prn  - chronic HTN: home labetalol 100 TID, continue vitals q4h  - CV:  VS per routine, AM CBC Hb 10.0. History of SVT, on telemetry  - Pulm: Encourage ISS & ambulation  - GI: regular diet  - : Marroquin in place, to be removed this morning, TOV this afternoon  - DVT ppx: SCDs, Lovenox 40mg QD  - Dispo: POD #2/3

## 2024-07-31 NOTE — CHART NOTE - NSCHARTNOTEFT_GEN_A_CORE
MD at bedside for prior abdominal pain - please refer to past note for details.    Pt received oxycodone, reports 8.5/10 pain abdomen. Was able to urinate, however still is unable to pass flatus/bowel movement (reports since Sunday). Patient is taking simethicone and milk of magnesium.    BP /61-77    HR 87-90     W 7.1>12.1>14.9    H 11.7>10>10   P 200>168>182     Cr 0.58>0.66   A/A 15/18>20/17   UrA 4.2>4.6     Patient still with guarding to RUQ/LUQ however feels abdominal pain on the left abdomen as well.     With repeat labwork and BPs wnl, unlikely picture for PEC at this time. Also unlikely for abdominal bleeding/peritonitis from bleeding given stable H/H.   Will make patient NPO, start fluids with concern for ileus.     D/w Dr. Pringle PGY3, to f/u with attending on call. MD at bedside for prior abdominal pain - please refer to past note for details.    Pt received oxycodone, reports 8.5/10 pain abdomen. Was able to urinate, however still is unable to pass flatus/bowel movement (reports since Sunday). Patient is taking simethicone and milk of magnesium.    BP /61-77    HR 87-90     W 7.1>12.1>14.9    H 11.7>10>10   P 200>168>182     Cr 0.58>0.66   A/A 15/18>20/17   UrA 4.2>4.6     Patient still with guarding to RUQ/LUQ however feels abdominal pain on the left abdomen as well.     With repeat labwork and BPs wnl, unlikely picture for PEC at this time. Also unlikely for abdominal bleeding/peritonitis from bleeding given stable H/H.   Will make patient NPO, start fluids with concern for ileus.   Abd XR ordered urgent     D/w Dr. Pringle PGY3, to f/u with attending on call.

## 2024-07-31 NOTE — PROGRESS NOTE ADULT - SUBJECTIVE AND OBJECTIVE BOX
Patient evaluated at bedside this morning, resting comfortably in bed, with no acute events overnight.  She reports pain is well controlled with oral pain medications.   She denies heavy vaginal bleeding.   She has not tried ambulating since procedure, cancino remains in place at this time. Tolerating regular diet.     Physical Exam:  Vital Signs Last 24 Hrs  T(C): 36.8 (31 Jul 2024 06:18), Max: 37 (30 Jul 2024 13:29)  T(F): 98.3 (31 Jul 2024 06:18), Max: 98.6 (30 Jul 2024 13:29)  HR: 90 (31 Jul 2024 06:18) (60 - 99)  BP: 97/61 (31 Jul 2024 06:18) (92/58 - 137/84)  BP(mean): --  RR: 17 (31 Jul 2024 06:18) (16 - 18)  SpO2: 97% (31 Jul 2024 06:18) (96% - 99%)    Parameters below as of 31 Jul 2024 06:18  Patient On (Oxygen Delivery Method): room air        GA: well-appearing, NAD  Pulm: no increased WOB  Abd: soft, nontender, no rebound or guarding, incision clean, dry and intact, uterus firm at midline,  fb below umbilicus  : cancino in situ, lochia WNL  Extremities: no calf tenderness, SCDs in place                            10.0   12.13 )-----------( 168      ( 31 Jul 2024 05:30 )             29.9               acetaminophen     Tablet .. 975 milliGRAM(s) Oral <User Schedule>  diphenhydrAMINE 25 milliGRAM(s) Oral every 6 hours PRN  diphtheria/tetanus/pertussis (acellular) Vaccine (Adacel) 0.5 milliLiter(s) IntraMuscular once  enoxaparin Injectable 40 milliGRAM(s) SubCutaneous every 24 hours  ibuprofen  Tablet. 600 milliGRAM(s) Oral every 6 hours  labetalol 100 milliGRAM(s) Oral every 8 hours  lactated ringers. 1000 milliLiter(s) IV Continuous <Continuous>  lanolin Ointment 1 Application(s) Topical every 6 hours PRN  magnesium hydroxide Suspension 30 milliLiter(s) Oral two times a day PRN  oxyCODONE    IR 5 milliGRAM(s) Oral every 3 hours PRN  oxyCODONE    IR 5 milliGRAM(s) Oral once PRN  oxytocin Infusion 333.333 milliUNIT(s)/Min IV Continuous <Continuous>  prenatal multivitamin 1 Tablet(s) Oral daily  simethicone 80 milliGRAM(s) Chew every 4 hours PRN

## 2024-08-01 PROCEDURE — 74176 CT ABD & PELVIS W/O CONTRAST: CPT | Mod: 26

## 2024-08-01 RX ORDER — KETOROLAC TROMETHAMINE 10 MG
30 TABLET ORAL EVERY 6 HOURS
Refills: 0 | Status: DISCONTINUED | OUTPATIENT
Start: 2024-08-01 | End: 2024-08-01

## 2024-08-01 RX ORDER — ACETAMINOPHEN 500 MG
1000 TABLET ORAL ONCE
Refills: 0 | Status: COMPLETED | OUTPATIENT
Start: 2024-08-01 | End: 2024-08-01

## 2024-08-01 RX ORDER — IOHEXOL 240 MG/ML
30 INJECTION, SOLUTION INTRATHECAL; INTRAVASCULAR; INTRAVENOUS; ORAL ONCE
Refills: 0 | Status: COMPLETED | OUTPATIENT
Start: 2024-08-01 | End: 2024-08-01

## 2024-08-01 RX ADMIN — Medication 30 MILLIGRAM(S): at 06:52

## 2024-08-01 RX ADMIN — Medication 400 MILLIGRAM(S): at 15:49

## 2024-08-01 RX ADMIN — Medication 100 MILLIGRAM(S): at 08:09

## 2024-08-01 RX ADMIN — Medication 30 MILLILITER(S): at 15:47

## 2024-08-01 RX ADMIN — PETROLATUM 1 APPLICATION(S): 87.32 OINTMENT TOPICAL at 18:05

## 2024-08-01 RX ADMIN — SIMETHICONE 80 MILLIGRAM(S): 125 TABLET, CHEWABLE ORAL at 08:53

## 2024-08-01 RX ADMIN — Medication 30 MILLIGRAM(S): at 00:48

## 2024-08-01 RX ADMIN — Medication 400 MILLIGRAM(S): at 08:53

## 2024-08-01 RX ADMIN — Medication 30 MILLIGRAM(S): at 12:19

## 2024-08-01 RX ADMIN — ENOXAPARIN SODIUM 40 MILLIGRAM(S): 120 INJECTION SUBCUTANEOUS at 23:41

## 2024-08-01 RX ADMIN — Medication 30 MILLIGRAM(S): at 18:04

## 2024-08-01 RX ADMIN — Medication 100 MILLIGRAM(S): at 15:49

## 2024-08-01 RX ADMIN — Medication 1 TABLET(S): at 12:19

## 2024-08-01 RX ADMIN — PETROLATUM 1 APPLICATION(S): 87.32 OINTMENT TOPICAL at 06:48

## 2024-08-01 RX ADMIN — Medication 400 MILLIGRAM(S): at 23:42

## 2024-08-01 RX ADMIN — IOHEXOL 30 MILLILITER(S): 240 INJECTION, SOLUTION INTRATHECAL; INTRAVASCULAR; INTRAVENOUS; ORAL at 17:32

## 2024-08-01 RX ADMIN — SIMETHICONE 80 MILLIGRAM(S): 125 TABLET, CHEWABLE ORAL at 15:47

## 2024-08-01 RX ADMIN — Medication 400 MILLIGRAM(S): at 03:18

## 2024-08-01 NOTE — CHART NOTE - NSCHARTNOTEFT_GEN_A_CORE
MD to bedside. Patient informed that team had consulted General Surgery for rule out ileus vs. distal obstruction and that Gen Surg had recommended CT A+P with oral contrast. Patient agreed with plan to proceed with imaging; continues to endorse severe 10/10 pain and inability to pass flatus. Last bowel movement was on Sunday.

## 2024-08-01 NOTE — PROGRESS NOTE ADULT - ASSESSMENT
A/P: 34y s/p primary  section and myomectomy c/b chronic HTN and SVT , POD#2, stable  -  Pain: PO motrin q6hrs, tylenol q8hrs, oxycodone for severe pain PRN  - chronic HTN: normotensive overnight on home labetalol 100 TID   - SVT s/p telemetry x24hr   -  Post-operatively labs: post-op Hgb 10.6, hemodynamically stable, no symptoms of acute blood loss anemia   -  GI: concern for ileus, pending KUB. NPO, IVF  -  : s/p cancino , urinating without difficulty  -  DVT prophylaxis: encouraged increased ambulation, SCDs, SQL  -  Dispo: POD 3 or 4

## 2024-08-01 NOTE — PROGRESS NOTE ADULT - SUBJECTIVE AND OBJECTIVE BOX
Patient evaluated at bedside this morning, resting comfortable in bed. She states pain is mostly in upper abdomen and minimally relieved with medication, has not yet passed flatus. She is ambulating. She was stepped back to NPO due to concern for ileus, XR pending.   She reports pain is well controlled with oral pain medications.   She denies heavy vaginal bleeding.    Physical Exam:  Vital Signs Last 24 Hrs  T(C): 36.7 (01 Aug 2024 02:21), Max: 37 (31 Jul 2024 10:00)  T(F): 98 (01 Aug 2024 02:21), Max: 98.6 (31 Jul 2024 10:00)  HR: 80 (01 Aug 2024 02:21) (80 - 90)  BP: 104/67 (01 Aug 2024 02:21) (97/61 - 125/77)  BP(mean): --  RR: 18 (01 Aug 2024 02:21) (15 - 18)  SpO2: 99% (01 Aug 2024 02:21) (96% - 99%)    Parameters below as of 01 Aug 2024 02:21  Patient On (Oxygen Delivery Method): room air        GA: well-appearing, NAD  Pulm: no increased WOB  Abd: soft, nontender, no rebound or guarding, incision clean, dry and intact, uterus firm at midline,  fb below umbilicus  Extremities: no calf tenderness                             10.6   14.92 )-----------( 182      ( 31 Jul 2024 16:40 )             31.4     07-31    135  |  103  |  8   ----------------------------<  89  4.2   |  24  |  0.66    Ca    9.2      31 Jul 2024 16:40    TPro  6.0  /  Alb  3.1<L>  /  TBili  <0.2  /  DBili  x   /  AST  20  /  ALT  17  /  AlkPhos  103  07-31

## 2024-08-01 NOTE — CHART NOTE - NSCHARTNOTEFT_GEN_A_CORE
Patient seen at bedside multiple times this morning, c/o abdominal pain. She reports that she still has not passed gas. At rest and while walking, she states pain is minimal; however, she gets sharp, severe, pain intermittently. Denies nausea/vomiting, remains NPO/. VSS. KUB obtained yesterday, notable for air-filled distended bowel, c/w ileus but cannot rule out distal obstruction. Discussed with patient and Dr. Finley, general surgery team consulted. Recommend CT A/P with PO contrast to r/o obstruction. Patient amenable to plan. Will continue to monitor closely.

## 2024-08-02 LAB
ANION GAP SERPL CALC-SCNC: 12 MMOL/L — SIGNIFICANT CHANGE UP (ref 5–17)
ANISOCYTOSIS BLD QL: SLIGHT — SIGNIFICANT CHANGE UP
BASOPHILS # BLD AUTO: 0 K/UL — SIGNIFICANT CHANGE UP (ref 0–0.2)
BASOPHILS NFR BLD AUTO: 0 % — SIGNIFICANT CHANGE UP (ref 0–2)
BUN SERPL-MCNC: 10 MG/DL — SIGNIFICANT CHANGE UP (ref 7–23)
BURR CELLS BLD QL SMEAR: PRESENT — SIGNIFICANT CHANGE UP
CALCIUM SERPL-MCNC: 8.9 MG/DL — SIGNIFICANT CHANGE UP (ref 8.4–10.5)
CHLORIDE SERPL-SCNC: 104 MMOL/L — SIGNIFICANT CHANGE UP (ref 96–108)
CO2 SERPL-SCNC: 20 MMOL/L — LOW (ref 22–31)
CREAT SERPL-MCNC: 0.56 MG/DL — SIGNIFICANT CHANGE UP (ref 0.5–1.3)
DACRYOCYTES BLD QL SMEAR: SLIGHT — SIGNIFICANT CHANGE UP
EGFR: 122 ML/MIN/1.73M2 — SIGNIFICANT CHANGE UP
EOSINOPHIL # BLD AUTO: 0 K/UL — SIGNIFICANT CHANGE UP (ref 0–0.5)
EOSINOPHIL NFR BLD AUTO: 0 % — SIGNIFICANT CHANGE UP (ref 0–6)
GIANT PLATELETS BLD QL SMEAR: PRESENT — SIGNIFICANT CHANGE UP
GLUCOSE SERPL-MCNC: 70 MG/DL — SIGNIFICANT CHANGE UP (ref 70–99)
HCT VFR BLD CALC: 27.9 % — LOW (ref 34.5–45)
HGB BLD-MCNC: 9.2 G/DL — LOW (ref 11.5–15.5)
LYMPHOCYTES # BLD AUTO: 0.73 K/UL — LOW (ref 1–3.3)
LYMPHOCYTES # BLD AUTO: 8.8 % — LOW (ref 13–44)
MAGNESIUM SERPL-MCNC: 1.8 MG/DL — SIGNIFICANT CHANGE UP (ref 1.6–2.6)
MANUAL SMEAR VERIFICATION: SIGNIFICANT CHANGE UP
MCHC RBC-ENTMCNC: 29.1 PG — SIGNIFICANT CHANGE UP (ref 27–34)
MCHC RBC-ENTMCNC: 33 GM/DL — SIGNIFICANT CHANGE UP (ref 32–36)
MCV RBC AUTO: 88.3 FL — SIGNIFICANT CHANGE UP (ref 80–100)
MICROCYTES BLD QL: SLIGHT — SIGNIFICANT CHANGE UP
MONOCYTES # BLD AUTO: 0.29 K/UL — SIGNIFICANT CHANGE UP (ref 0–0.9)
MONOCYTES NFR BLD AUTO: 3.5 % — SIGNIFICANT CHANGE UP (ref 2–14)
MYELOCYTES NFR BLD: 0.9 % — HIGH (ref 0–0)
NEUTROPHILS # BLD AUTO: 7.24 K/UL — SIGNIFICANT CHANGE UP (ref 1.8–7.4)
NEUTROPHILS NFR BLD AUTO: 86.8 % — HIGH (ref 43–77)
OVALOCYTES BLD QL SMEAR: SLIGHT — SIGNIFICANT CHANGE UP
PHOSPHATE SERPL-MCNC: 4.6 MG/DL — HIGH (ref 2.5–4.5)
PLAT MORPH BLD: ABNORMAL
PLATELET # BLD AUTO: 199 K/UL — SIGNIFICANT CHANGE UP (ref 150–400)
POIKILOCYTOSIS BLD QL AUTO: SLIGHT — SIGNIFICANT CHANGE UP
POTASSIUM SERPL-MCNC: 4 MMOL/L — SIGNIFICANT CHANGE UP (ref 3.5–5.3)
POTASSIUM SERPL-SCNC: 4 MMOL/L — SIGNIFICANT CHANGE UP (ref 3.5–5.3)
RBC # BLD: 3.16 M/UL — LOW (ref 3.8–5.2)
RBC # FLD: 14.8 % — HIGH (ref 10.3–14.5)
RBC BLD AUTO: ABNORMAL
SODIUM SERPL-SCNC: 136 MMOL/L — SIGNIFICANT CHANGE UP (ref 135–145)
WBC # BLD: 8.34 K/UL — SIGNIFICANT CHANGE UP (ref 3.8–10.5)
WBC # FLD AUTO: 8.34 K/UL — SIGNIFICANT CHANGE UP (ref 3.8–10.5)

## 2024-08-02 RX ORDER — MAGNESIUM SULFATE 500 MG/ML
2 VIAL (ML) INJECTION ONCE
Refills: 0 | Status: DISCONTINUED | OUTPATIENT
Start: 2024-08-02 | End: 2024-08-02

## 2024-08-02 RX ORDER — ACETAMINOPHEN 500 MG
1000 TABLET ORAL ONCE
Refills: 0 | Status: COMPLETED | OUTPATIENT
Start: 2024-08-02 | End: 2024-08-02

## 2024-08-02 RX ORDER — KETOROLAC TROMETHAMINE 10 MG
30 TABLET ORAL ONCE
Refills: 0 | Status: DISCONTINUED | OUTPATIENT
Start: 2024-08-02 | End: 2024-08-02

## 2024-08-02 RX ORDER — ASPIRIN 325 MG
10 TABLET ORAL ONCE
Refills: 0 | Status: COMPLETED | OUTPATIENT
Start: 2024-08-02 | End: 2024-08-02

## 2024-08-02 RX ORDER — MAGNESIUM SULFATE 500 MG/ML
2 VIAL (ML) INJECTION ONCE
Refills: 0 | Status: COMPLETED | OUTPATIENT
Start: 2024-08-02 | End: 2024-08-02

## 2024-08-02 RX ADMIN — Medication 100 MILLIGRAM(S): at 00:22

## 2024-08-02 RX ADMIN — Medication 100 MILLIGRAM(S): at 08:00

## 2024-08-02 RX ADMIN — DEXTROSE MONOHYDRATE, SODIUM CHLORIDE, SODIUM LACTATE, CALCIUM CHLORIDE, MAGNESIUM CHLORIDE 125 MILLILITER(S): 1.5; 538; 448; 18.4; 5.08 SOLUTION INTRAPERITONEAL at 09:18

## 2024-08-02 RX ADMIN — SIMETHICONE 80 MILLIGRAM(S): 125 TABLET, CHEWABLE ORAL at 21:44

## 2024-08-02 RX ADMIN — SIMETHICONE 80 MILLIGRAM(S): 125 TABLET, CHEWABLE ORAL at 03:37

## 2024-08-02 RX ADMIN — SIMETHICONE 80 MILLIGRAM(S): 125 TABLET, CHEWABLE ORAL at 11:51

## 2024-08-02 RX ADMIN — Medication 100 MILLIGRAM(S): at 23:06

## 2024-08-02 RX ADMIN — Medication 400 MILLIGRAM(S): at 06:54

## 2024-08-02 RX ADMIN — PETROLATUM 1 APPLICATION(S): 87.32 OINTMENT TOPICAL at 11:55

## 2024-08-02 RX ADMIN — Medication 400 MILLIGRAM(S): at 15:28

## 2024-08-02 RX ADMIN — Medication 30 MILLIGRAM(S): at 11:39

## 2024-08-02 RX ADMIN — Medication 30 MILLIGRAM(S): at 03:38

## 2024-08-02 RX ADMIN — SIMETHICONE 80 MILLIGRAM(S): 125 TABLET, CHEWABLE ORAL at 07:31

## 2024-08-02 RX ADMIN — Medication 25 GRAM(S): at 09:19

## 2024-08-02 RX ADMIN — Medication 100 MILLIGRAM(S): at 15:28

## 2024-08-02 RX ADMIN — Medication 975 MILLIGRAM(S): at 21:50

## 2024-08-02 RX ADMIN — Medication 1 APPLICATION(S): at 21:45

## 2024-08-02 RX ADMIN — ENOXAPARIN SODIUM 40 MILLIGRAM(S): 120 INJECTION SUBCUTANEOUS at 20:33

## 2024-08-02 RX ADMIN — Medication 10 MILLIGRAM(S): at 21:45

## 2024-08-02 NOTE — PROGRESS NOTE ADULT - ASSESSMENT
A/P: 34y s/p primary  section and myomectomy after failed IOL for chronic HTN, POD#2, stable  -  Pain: PO motrin q6hrs, tylenol q8hrs, oxycodone for severe pain PRN  - chronic HTN: labetalol 100 TID, continue vitals q4h  - Ileus: NPO, , CT prelim read shows possible ileus. Patient now passing gas, hopeful for return of bowel fxn; f/u general surgery consult  -  Post-operatively labs: post-op Hgb , hemodynamically stable, no symptoms of acute blood loss anemia   -  GI: tolerating regular diet, passing gas  -  : s/p cancino , urinating without difficulty  -  DVT prophylaxis: encouraged increased ambulation, SCDs, SQL  -  Dispo: POD 3 or 4

## 2024-08-02 NOTE — PROGRESS NOTE ADULT - SUBJECTIVE AND OBJECTIVE BOX
Patient evaluated at bedside this morning, resting in bed. She reports pain is unchanged although she started passing some gas last evening around 6pm.  She reports pain is well controlled with oral pain medications.   She denies heavy vaginal bleeding.   She has been ambulating without assistance, voiding spontaneously.    Physical Exam:  Vital Signs Last 24 Hrs  T(C): 36.9 (02 Aug 2024 06:23), Max: 36.9 (01 Aug 2024 10:00)  T(F): 98.4 (02 Aug 2024 06:23), Max: 98.4 (01 Aug 2024 10:00)  HR: 76 (02 Aug 2024 06:23) (76 - 86)  BP: 127/75 (02 Aug 2024 06:23) (109/60 - 137/86)  BP(mean): --  RR: 18 (02 Aug 2024 06:23) (18 - 18)  SpO2: 98% (02 Aug 2024 06:23) (97% - 100%)    Parameters below as of 02 Aug 2024 06:23  Patient On (Oxygen Delivery Method): room air        GA: well-appearing, NAD  Pulm: no increased WOB  Abd: soft, nontender, no rebound or guarding, incision clean, dry and intact, uterus firm at midline,  fb below umbilicus  Extremities: no calf tenderness                             10.6   14.92 )-----------( 182      ( 31 Jul 2024 16:40 )             31.4     07-31    135  |  103  |  8   ----------------------------<  89  4.2   |  24  |  0.66    Ca    9.2      31 Jul 2024 16:40    TPro  6.0  /  Alb  3.1<L>  /  TBili  <0.2  /  DBili  x   /  AST  20  /  ALT  17  /  AlkPhos  103  07-31

## 2024-08-02 NOTE — CONSULT NOTE ADULT - SUBJECTIVE AND OBJECTIVE BOX
33yo Female pt with PMH of hypertension, cholestasis of pregnancy and PSHx of open inguinal hernia repair without mesh is post operative day 3 from a .    In the ED, pt afebrile, nontachycardic, normotensive, and satting on RA. On exam, _____. Labs significant for ______. CTAP showing _____.     PMH:  PSHx:  Medications:  Allergies:  Social Hx:  Family Hx: Denies family hx of IBS, Crohn's, UC, or colon cancer.  Last colonoscopy:  Last EGD:    T(C): 36.7 (24 @ 10:00), Max: 36.9 (24 @ 23:51)  HR: 80 (24 @ 10:00) (76 - 86)  BP: 121/76 (24 @ 10:00) (109/72 - 137/86)  RR: 17 (24 @ 10:00) (17 - 18)  SpO2: 98% (24 @ 10:00) (97% - 100%)    Physical Exam  General: AAOx3, NAD, laying comfortably in bed  Cardio: S1,S2, No MRG  Pulm: Nonlabored breathing  Abdomen:  Extremities: WWP, peripheral pulses appreciated      LABS:                        9.2    8.34  )-----------( 199      ( 02 Aug 2024 05:30 )             27.9     08-02    136  |  104  |  10  ----------------------------<  70  4.0   |  20<L>  |  0.56    Ca    8.9      02 Aug 2024 05:30  Phos  4.6     08-  Mg     1.8     08-    TPro  6.0  /  Alb  3.1<L>  /  TBili  <0.2  /  DBili  x   /  AST  20  /  ALT  17  /  AlkPhos  103  07-31            [age]yo Female pt with PMH of hypertension, cholestasis of pregnancy and PSHx of open inguinal hernia repair without mesh is post operative day 3 from a . On , she had her  and 6 hours post operatively had large volume bilious emesis. The patient felt better and was able to tolerate a diet later that day. On , the patient had increased bloating and was made NPO for concern of ileus versus SBO. The patient has been having intermittent, diffuse abdominal pain since  that is exacerbated by walking around. She has not had this pain before. She has had increased bloating but denies any fevers, or additional nausea and vomiting since . She started to pass intermittent flatus the evening of  and feels her abdominal pain has mildly improved as of . She has not had a bowel movement since her admission and has tried Miralax and milk of magnesia.     In her room, the pt is afebrile, nontachycardic, normotensive, and satting on RA. On exam, her abdomen is moderately distended, diffusely tender to palpation with most tenderness in RUQ, and has no rebound or gaurding. She has no Serrano's sign. Labs significant for hemoglobin 9.2. Abdominal Xray on  showed air-filled distended bowel compatible with ileus. CTAP with oral contrast on  showed no dilated loops of small bowel with contrast in the colon.    PMH: Hypertension, cholestasis of pregnancy  PSHx: open inguinal hernia repair without mesh, 1   Medications: labetalol 200 BID, ursodiol 300mg BID, aspirin 81mg        Allergies: none  Social Hx: no alcohol, tobacco or drug use  Family Hx: Denies family hx of IBS, Crohn's, UC, or colon cancer.  Last colonoscopy: never  Last EGD: never    T(C): 36.7 (24 @ 10:00), Max: 36.9 (24 @ 23:51)  HR: 80 (24 @ 10:00) (76 - 86)  BP: 121/76 (24 @ 10:00) (109/72 - 137/86)  RR: 17 (24 @ 10:00) (17 - 18)  SpO2: 98% (24 @ 10:00) (97% - 100%)    Physical Exam  General: AAOx3, NAD, laying comfortably in bed  Cardio: S1,S2, No MRG  Pulm: Nonlabored breathing  Abdomen: soft, moderately distended, diffusely tender to palpation with most pain in RUQ, no rebound or gaurding  Extremities: WWP, peripheral pulses appreciated      LABS:                        9.2    8.34  )-----------( 199      ( 02 Aug 2024 05:30 )             27.9     08-    136  |  104  |  10  ----------------------------<  70  4.0   |  20<L>  |  0.56    Ca    8.9      02 Aug 2024 05:30  Phos  4.6       Mg     1.8         TPro  6.0  /  Alb  3.1<L>  /  TBili  <0.2  /  DBili  x   /  AST  20  /  ALT  17  /  AlkPhos  103

## 2024-08-03 LAB
ADD ON TEST-SPECIMEN IN LAB: SIGNIFICANT CHANGE UP
ALBUMIN SERPL ELPH-MCNC: 2.7 G/DL — LOW (ref 3.3–5)
ALBUMIN SERPL ELPH-MCNC: 2.8 G/DL — LOW (ref 3.3–5)
ALP SERPL-CCNC: 90 U/L — SIGNIFICANT CHANGE UP (ref 40–120)
ALP SERPL-CCNC: 95 U/L — SIGNIFICANT CHANGE UP (ref 40–120)
ALT FLD-CCNC: 30 U/L — SIGNIFICANT CHANGE UP (ref 10–45)
ALT FLD-CCNC: 38 U/L — SIGNIFICANT CHANGE UP (ref 10–45)
ANION GAP SERPL CALC-SCNC: 13 MMOL/L — SIGNIFICANT CHANGE UP (ref 5–17)
ANION GAP SERPL CALC-SCNC: 16 MMOL/L — SIGNIFICANT CHANGE UP (ref 5–17)
ANISOCYTOSIS BLD QL: SLIGHT — SIGNIFICANT CHANGE UP
AST SERPL-CCNC: 28 U/L — SIGNIFICANT CHANGE UP (ref 10–40)
AST SERPL-CCNC: 29 U/L — SIGNIFICANT CHANGE UP (ref 10–40)
BASOPHILS # BLD AUTO: 0.07 K/UL — SIGNIFICANT CHANGE UP (ref 0–0.2)
BASOPHILS NFR BLD AUTO: 0.9 % — SIGNIFICANT CHANGE UP (ref 0–2)
BILIRUB DIRECT SERPL-MCNC: <0.2 MG/DL — SIGNIFICANT CHANGE UP (ref 0–0.3)
BILIRUB INDIRECT FLD-MCNC: SIGNIFICANT CHANGE UP (ref 0.2–1)
BILIRUB SERPL-MCNC: 0.2 MG/DL — SIGNIFICANT CHANGE UP (ref 0.2–1.2)
BILIRUB SERPL-MCNC: 0.2 MG/DL — SIGNIFICANT CHANGE UP (ref 0.2–1.2)
BUN SERPL-MCNC: 10 MG/DL — SIGNIFICANT CHANGE UP (ref 7–23)
BUN SERPL-MCNC: 8 MG/DL — SIGNIFICANT CHANGE UP (ref 7–23)
BURR CELLS BLD QL SMEAR: PRESENT — SIGNIFICANT CHANGE UP
CALCIUM SERPL-MCNC: 8.2 MG/DL — LOW (ref 8.4–10.5)
CALCIUM SERPL-MCNC: 8.7 MG/DL — SIGNIFICANT CHANGE UP (ref 8.4–10.5)
CHLORIDE SERPL-SCNC: 102 MMOL/L — SIGNIFICANT CHANGE UP (ref 96–108)
CHLORIDE SERPL-SCNC: 103 MMOL/L — SIGNIFICANT CHANGE UP (ref 96–108)
CO2 SERPL-SCNC: 15 MMOL/L — LOW (ref 22–31)
CO2 SERPL-SCNC: 17 MMOL/L — LOW (ref 22–31)
CREAT SERPL-MCNC: 0.61 MG/DL — SIGNIFICANT CHANGE UP (ref 0.5–1.3)
CREAT SERPL-MCNC: 0.62 MG/DL — SIGNIFICANT CHANGE UP (ref 0.5–1.3)
DACRYOCYTES BLD QL SMEAR: SLIGHT — SIGNIFICANT CHANGE UP
EGFR: 119 ML/MIN/1.73M2 — SIGNIFICANT CHANGE UP
EGFR: 119 ML/MIN/1.73M2 — SIGNIFICANT CHANGE UP
EOSINOPHIL # BLD AUTO: 0.14 K/UL — SIGNIFICANT CHANGE UP (ref 0–0.5)
EOSINOPHIL NFR BLD AUTO: 1.7 % — SIGNIFICANT CHANGE UP (ref 0–6)
GIANT PLATELETS BLD QL SMEAR: PRESENT — SIGNIFICANT CHANGE UP
GLUCOSE SERPL-MCNC: 66 MG/DL — LOW (ref 70–99)
GLUCOSE SERPL-MCNC: 67 MG/DL — LOW (ref 70–99)
HCT VFR BLD CALC: 29.5 % — LOW (ref 34.5–45)
HGB BLD-MCNC: 9.7 G/DL — LOW (ref 11.5–15.5)
HYPOCHROMIA BLD QL: SLIGHT — SIGNIFICANT CHANGE UP
LYMPHOCYTES # BLD AUTO: 0.42 K/UL — LOW (ref 1–3.3)
LYMPHOCYTES # BLD AUTO: 5.2 % — LOW (ref 13–44)
MACROCYTES BLD QL: SLIGHT — SIGNIFICANT CHANGE UP
MAGNESIUM SERPL-MCNC: 1.7 MG/DL — SIGNIFICANT CHANGE UP (ref 1.6–2.6)
MAGNESIUM SERPL-MCNC: 3.7 MG/DL — HIGH (ref 1.6–2.6)
MAGNESIUM SERPL-MCNC: 4.6 MG/DL — HIGH (ref 1.6–2.6)
MANUAL SMEAR VERIFICATION: SIGNIFICANT CHANGE UP
MCHC RBC-ENTMCNC: 27.9 PG — SIGNIFICANT CHANGE UP (ref 27–34)
MCHC RBC-ENTMCNC: 32.9 GM/DL — SIGNIFICANT CHANGE UP (ref 32–36)
MCV RBC AUTO: 84.8 FL — SIGNIFICANT CHANGE UP (ref 80–100)
MICROCYTES BLD QL: SLIGHT — SIGNIFICANT CHANGE UP
MONOCYTES # BLD AUTO: 0.84 K/UL — SIGNIFICANT CHANGE UP (ref 0–0.9)
MONOCYTES NFR BLD AUTO: 10.4 % — SIGNIFICANT CHANGE UP (ref 2–14)
NEUTROPHILS # BLD AUTO: 6.63 K/UL — SIGNIFICANT CHANGE UP (ref 1.8–7.4)
NEUTROPHILS NFR BLD AUTO: 81.8 % — HIGH (ref 43–77)
OVALOCYTES BLD QL SMEAR: SLIGHT — SIGNIFICANT CHANGE UP
PHOSPHATE SERPL-MCNC: 4.1 MG/DL — SIGNIFICANT CHANGE UP (ref 2.5–4.5)
PLAT MORPH BLD: ABNORMAL
PLATELET # BLD AUTO: 288 K/UL — SIGNIFICANT CHANGE UP (ref 150–400)
POIKILOCYTOSIS BLD QL AUTO: SIGNIFICANT CHANGE UP
POTASSIUM SERPL-MCNC: 4.2 MMOL/L — SIGNIFICANT CHANGE UP (ref 3.5–5.3)
POTASSIUM SERPL-MCNC: 4.2 MMOL/L — SIGNIFICANT CHANGE UP (ref 3.5–5.3)
POTASSIUM SERPL-SCNC: 4.2 MMOL/L — SIGNIFICANT CHANGE UP (ref 3.5–5.3)
POTASSIUM SERPL-SCNC: 4.2 MMOL/L — SIGNIFICANT CHANGE UP (ref 3.5–5.3)
PROT SERPL-MCNC: 5.7 G/DL — LOW (ref 6–8.3)
PROT SERPL-MCNC: 5.8 G/DL — LOW (ref 6–8.3)
RBC # BLD: 3.48 M/UL — LOW (ref 3.8–5.2)
RBC # FLD: 14.6 % — HIGH (ref 10.3–14.5)
RBC BLD AUTO: ABNORMAL
SODIUM SERPL-SCNC: 133 MMOL/L — LOW (ref 135–145)
SODIUM SERPL-SCNC: 133 MMOL/L — LOW (ref 135–145)
SPHEROCYTES BLD QL SMEAR: SIGNIFICANT CHANGE UP
WBC # BLD: 8.11 K/UL — SIGNIFICANT CHANGE UP (ref 3.8–10.5)
WBC # FLD AUTO: 8.11 K/UL — SIGNIFICANT CHANGE UP (ref 3.8–10.5)

## 2024-08-03 PROCEDURE — 76705 ECHO EXAM OF ABDOMEN: CPT | Mod: 26

## 2024-08-03 RX ORDER — LABETALOL HCL 200 MG
100 TABLET ORAL ONCE
Refills: 0 | Status: COMPLETED | OUTPATIENT
Start: 2024-08-03 | End: 2024-08-03

## 2024-08-03 RX ORDER — MAGNESIUM SULFATE 500 MG/ML
4 VIAL (ML) INJECTION ONCE
Refills: 0 | Status: COMPLETED | OUTPATIENT
Start: 2024-08-03 | End: 2024-08-03

## 2024-08-03 RX ORDER — BACTERIOSTATIC SODIUM CHLORIDE 0.9 %
1000 VIAL (ML) INJECTION
Refills: 0 | Status: DISCONTINUED | OUTPATIENT
Start: 2024-08-03 | End: 2024-08-06

## 2024-08-03 RX ORDER — ACETAMINOPHEN 500 MG
1000 TABLET ORAL ONCE
Refills: 0 | Status: COMPLETED | OUTPATIENT
Start: 2024-08-03 | End: 2024-08-03

## 2024-08-03 RX ORDER — MAGNESIUM SULFATE 500 MG/ML
2 VIAL (ML) INJECTION
Qty: 40 | Refills: 0 | Status: DISCONTINUED | OUTPATIENT
Start: 2024-08-03 | End: 2024-08-04

## 2024-08-03 RX ORDER — MAGNESIUM SULFATE 500 MG/ML
2 VIAL (ML) INJECTION ONCE
Refills: 0 | Status: COMPLETED | OUTPATIENT
Start: 2024-08-03 | End: 2024-08-03

## 2024-08-03 RX ORDER — HYDRALAZINE HYDROCHLORIDE 100 MG/1
10 TABLET ORAL ONCE
Refills: 0 | Status: COMPLETED | OUTPATIENT
Start: 2024-08-03 | End: 2024-08-03

## 2024-08-03 RX ORDER — LABETALOL HCL 200 MG
200 TABLET ORAL EVERY 8 HOURS
Refills: 0 | Status: DISCONTINUED | OUTPATIENT
Start: 2024-08-03 | End: 2024-08-05

## 2024-08-03 RX ADMIN — SIMETHICONE 80 MILLIGRAM(S): 125 TABLET, CHEWABLE ORAL at 11:49

## 2024-08-03 RX ADMIN — Medication 300 GRAM(S): at 11:09

## 2024-08-03 RX ADMIN — Medication 975 MILLIGRAM(S): at 23:59

## 2024-08-03 RX ADMIN — HYDRALAZINE HYDROCHLORIDE 10 MILLIGRAM(S): 100 TABLET ORAL at 10:51

## 2024-08-03 RX ADMIN — PETROLATUM 1 APPLICATION(S): 87.32 OINTMENT TOPICAL at 18:16

## 2024-08-03 RX ADMIN — Medication 400 MILLIGRAM(S): at 04:34

## 2024-08-03 RX ADMIN — Medication 975 MILLIGRAM(S): at 18:03

## 2024-08-03 RX ADMIN — SIMETHICONE 80 MILLIGRAM(S): 125 TABLET, CHEWABLE ORAL at 17:20

## 2024-08-03 RX ADMIN — Medication 100 MILLIGRAM(S): at 11:09

## 2024-08-03 RX ADMIN — ENOXAPARIN SODIUM 40 MILLIGRAM(S): 120 INJECTION SUBCUTANEOUS at 20:59

## 2024-08-03 RX ADMIN — Medication 50 GM/HR: at 11:38

## 2024-08-03 RX ADMIN — Medication 25 GRAM(S): at 10:27

## 2024-08-03 RX ADMIN — Medication 200 MILLIGRAM(S): at 18:16

## 2024-08-03 RX ADMIN — SIMETHICONE 80 MILLIGRAM(S): 125 TABLET, CHEWABLE ORAL at 21:25

## 2024-08-03 RX ADMIN — Medication 100 MILLIGRAM(S): at 07:27

## 2024-08-03 RX ADMIN — Medication 975 MILLIGRAM(S): at 10:38

## 2024-08-03 RX ADMIN — Medication 75 MILLILITER(S): at 15:55

## 2024-08-03 NOTE — CHART NOTE - NSCHARTNOTEFT_GEN_A_CORE
Pt is a 34y  s/p pC/S and myomectomy, c/b PEC w/ SF and ileus, seen for a magnesium check. She endorses a mild headache and known RUQ/epigastric 10/10 spasmic pain that is episodic occurs 1x/hr. Denies HA, vision changes, dizziness, SOB, n/v. She had a bowel movement in the morning, has not had flatus since yesterday.     Physical Exam:  T(C): 36.8 (24 @ 22:30), Max: 37.1 (24 @ 06:00)  HR: 79 (24 @ 22:30) (58 - 82)  BP: 145/75 (24 @ 22:30) (111/68 - 166/96)  RR: 18 (24 @ 22:30) (16 - 19)  SpO2: 99% (24 @ 22:30) (98% - 99%)    24 @ 07:01  -  24 @ 07:00  --------------------------------------------------------  IN: 1500 mL / OUT: 0 mL / NET: 1500 mL    24 @ 07:01  -  24 @ 23:32  --------------------------------------------------------  IN: 1375 mL / OUT: 3150 mL / NET: -1775 mL      General: NAD, AAOx3  CV: NRR S1 S2   Pulm: no increased WOB, lungs clear to auscultation bilaterally   Abd: mild abdominal tenderness to epigastric/RUQ, consistent with prior 10/10 RUQ/epigastric pain in setting of ileus.   Extremities: warm/dry/intact skin, brachioradialis reflexes 2+ bilaterally, no edema/LE tenderness.     A&P:  Pt is a 34y  s/p pC/S and myomectomy, c/b PEC w/ SF and ileus, seen for a magnesium check,     1. Preeclampsia: Continue IV Magnesium @2G/hr for 24 hrs post delivery until ***  Denying toxic symptoms currently.   Antihypertensives: labetalol 200 TID  Continue to monitor blood pressures.   Next Magnesium check @ 0500 with full labs  Last Magnesium serum level 3.7, Mag level is received. Follow up next level.     2. GI: regular diet    3. : strict Is and Os, D/C cancino after discontinuation of IV Magnesium Pt is a 34y  s/p pC/S and myomectomy, c/b PEC w/ SF and ileus, seen for a magnesium check. She endorses a mild headache and known RUQ/epigastric 10/10 spasmic pain that is episodic occurs 1x/hr. Denies HA, vision changes, dizziness, SOB, n/v. She had a bowel movement in the morning, has not had flatus since yesterday.     Physical Exam:  T(C): 36.8 (24 @ 22:30), Max: 37.1 (24 @ 06:00)  HR: 79 (24 @ 22:30) (58 - 82)  BP: 145/75 (24 @ 22:30) (111/68 - 166/96)  RR: 18 (24 @ 22:30) (16 - 19)  SpO2: 99% (24 @ 22:30) (98% - 99%)    24 @ 07:01  -  24 @ 07:00  --------------------------------------------------------  IN: 1500 mL / OUT: 0 mL / NET: 1500 mL    24 @ 07:01  -  24 @ 23:32  --------------------------------------------------------  IN: 1375 mL / OUT: 3150 mL / NET: -1775 mL      General: NAD, AAOx3  CV: NRR S1 S2   Pulm: no increased WOB, lungs clear to auscultation bilaterally   Abd: mild abdominal tenderness to epigastric/RUQ, consistent with prior 10/10 RUQ/epigastric pain in setting of ileus.   Extremities: warm/dry/intact skin, brachioradialis reflexes 2+ bilaterally, no edema/LE tenderness.     A&P:  Pt is a 34y  s/p pC/S and myomectomy, c/b PEC w/ SF and ileus, seen for a magnesium check, stable.    1. Preeclampsia: Continue IV Magnesium @2G/hr for 24 hrs post delivery until  1100.  Mild headache and RUQ/epigastric discomfort consistent with prior symptoms. Denying other toxic symptoms currently. Tylenol PO for headache.  Antihypertensives: labetalol 200 TID  Continue to monitor blood pressures.   Next Magnesium check @ 0500 with full labs  Magnesium serum level 4.6. Follow up next level.     2. GI: advanced to full diet     3. : strict Is and Os, UOP 1000

## 2024-08-03 NOTE — PROGRESS NOTE ADULT - SUBJECTIVE AND OBJECTIVE BOX
Patient evaluated at bedside. Mrs. Salazar is accompanied by her .  I also saw Mrs. Salazar last evening.  After that evaluation and reviewing her chart and results, I ordered a Dulcolax suppository.  She reports that she then had 2 BM's and has passed flatus.  She is tearful knownig that she has to have magnesium for seizure prophylaxis.  She reports pain is well controlled with IV NSAID's.  She has denies needing narcotics for pain management as of yet.  There have been 2 severe range BP's recorded this morning.  She is just s/p hydralazine by IVP and IV magnesium has started as seizure prophylaxis.  Mrs. Salazar is concerned as she reports that the abdominal pain has "moved" from the right side to the middle.  She denies headache, dizziness, chest pain, palpitations, shortness of breath, nausea, vomiting or heavy vaginal bleeding.  She has been ambulating without assistance, voiding spontaneously, passing gas, tolerating clear liquid diet and is breastfeeding.    PHYSICAL EXAM:    GA: NAD, A+0 x 3  CV: RRR  Pulm: No additional work of breathing  Breasts: deferred  Abd: ( + ) BS, soft, mildly tender, moderately distended (less than on exam last night), no rebound or guarding,   Incision: clean, dry and intact; steri-strips in place  Uterus: Fundus midline; firm at the level of the umbilicus  : lochia WNL  Extremities: trace BL LE swelling w/o calf tenderness.  Weiss's sign neg BL. Reflexes +2 bilaterally                            9.2    8.34  )-----------( 199      ( 02 Aug 2024 05:30 )             27.9     -    133<L>  |  103  |  10  ----------------------------<  66<L>  4.2   |  17<L>  |  0.62    Ca    8.7      03 Aug 2024 05:30  Phos  4.1     08-03  Mg     1.7     08-      Magnesium: 1.7 mg/dL ( @ 05:30)        Assessment:    POD # 4 s/p  delivery  Hx of ICP and chronic HTN  Now with superimposed PEC with severe features due to severe range BP's    Plan:    Magnesium for seizure prophylaxis started  Mag checks q 6 hours  Labetalol increased to 200 mg po TID    Diet: continue clear liquid diet as tolerated  Labs: LFT's added to r/o liver inflammation   Pain meds: continue IV NSAID's as needed  Activity: OOB encouraged  Disposition: when clinically appropriate    Tona Finley MD

## 2024-08-03 NOTE — OB PROVIDER LABOR PROGRESS NOTE - NS_SUBJECTIVE/OBJECTIVE_OBGYN_ALL_OB_FT
FHT reviewed  On pit 15   Epidural in situ   135 bpm, moderate variability, +accels, -decels  Irregular Ctx q 2-3 every 10 min  Cat 1  Continue to monitor closely for labor progression
FHT reviewed  Patient s/p balloon, off pit. Plan for c/s for failure to dilate  135 bpm, moderate variability, +accels, -decels  1 contractions noted over a 20 min period   Cat 1. Reactive and reassuring. Continue to monitor closely.
FHT reviewed.   Patient now off pit. S/p balloon.   130 bpm, moderate variability, +accels, -decels.   Single contraction noted over 20 min period  Cat 1. Reactive and reassuring. Plan for c/s for failure to dilate.   Continue to monitor closely.
FHT reviwed  Patient on pit 10  140 bpm, moderate variability, +accels, -decels with area of loss of contact due to epidural placement  Cat 1  Irregular contractions. 2-4 q 10 min  Continue to monitor closely for labor progression
Patient currently on pitocin 12 mu/min. Last VE was 2/3 long.  FHR reviewed. Baseline rate is 130 bpm, moderate variability, + accels, - decels  Contractions are occurring irregularly, 3 in 10 minutes    A+P  This is a Category I tracing with moderate variability and + accels  -Fetal status reassuring; continue to monitor labor progression
Patient currently on pitocin 15 mu/min; last VE was 1 around balloon.  Baseline rate is 125 bpm, moderate variability, + accels, - decels  Contractions are occurring every 3-5 mins.    A+P  This is a Category I tracing with moderate variability and + accels  -Fetal status reassuring; continue to monitor labor progression
Patient is on pitocin 20 mu/min. Last VE was 1 around the balloon.  Baseline rate is 130 bpm, moderate variability, + accels, - decels  Contractions are occurring q4-5 mins  A+P  This is a Category I tracing with moderate variability and + accels  -Fetal status reassuring; continue to monitor labor progression
Patient seen at bedside for 3 min deceleration to 60s. Patient repositioned and examined. Exam remained the same at 2-3 long. FHR returned to baseline at 120 bpm with moderate variability.  Plan  -Continue to reposition and resuscitate as needed
Patient seen at bedside; she is resting comfortably. Pitocin is at 7 mu/min. VE is 7/50/-1.  Baseline rate is 110 bpm, mod sayra, + accels, + occasional late decels  This is a Category II tracing; overall reassuring with moderate variability and + accels  -Fetal status reassuring; continue to monitor labor progression  -Continue to reposition and resuscitate as needed  - Will increase pitocin as tolerated
Pt seen at bedside, resting comfortably.   SVE 3/long  Pitocin at 20  EFM reviewed. Baseline 125bpm, mod sayra, +accels, no decels. Cat I tracing. Ctx irregular, 3-4/10min. 150 MVU, inadequate
Pt seen at bedside. Balloon removed after 12hrs. SVE 2-3/long/posterior. AROM clear fluid. IUPC placed to measure ctx. Pitocin at 20 since 08:45. Will measure strength of ctx and increase pitocin as tolerated.   EFM reviewed. Baseline 145bpm, mod sayra, +accels, +nonrecurrent late decelerations. Cat II tracing. 2-3 ctx in 10 min, will measure on IUPC. Dr. Hardeep chapman.
Pt seen at bedside. Pt resting comfortably with epidural.   VE completed. 1-2/balloon. Balloon placed at 23:20 last night and will need to be removed after 12hrs.   EFM reviewed. Baseline 135 bpm, mod sayra, +accels, no decels. Cat I tracing.   Dr. Meier aware. Will continue to monitor closely.
FHT reviewed. Baseline 130, mod variability, +accels, -decels. Rare ctx, 1-2 in 10 min

## 2024-08-03 NOTE — CHART NOTE - NSCHARTNOTEFT_GEN_A_CORE
Pt is a 34y yo s/p  section, POD#4 w/ cHTN and post-operative course c/b ileus, stable, c/b PEC w/ SF, seen for a magnesium check. She denies HA, vision changes, dizziness, SOB, n/v, RUQ/epigastric pain.     Physical Exam:  T(C): 37.1 (24 @ 10:20), Max: 37.1 (24 @ 21:25)  HR: 76 (24 @ 16:00) (58 - 88)  BP: 128/78 (24 @ 16:00) (111/68 - 166/96)  RR: 19 (24 @ 10:20) (16 - 19)  SpO2: 99% (24 @ 10:20) (98% - 99%)    24 @ 07:01  -  24 @ 07:00  --------------------------------------------------------  IN: 1500 mL / OUT: 0 mL / NET: 1500 mL    24 @ 07:01  -  24 @ 17:23  --------------------------------------------------------  IN: 750 mL / OUT: 1450 mL / NET: -700 mL      General: NAD, AAOx3  Pulm: no increased WOB, lungs clear to auscultation bilaterally  Abd: no tenderness in RUQ/epigastric areas  Extremities: warm/dry/intact skin, biceps reflexes 2+ bilaterally, no edema    A&P:   Pt is a 34y yo s/p  section, POD#4 w/ cHTN and post-operative course c/b ileus, stable, c/b PEC w/ SF, seen for a magnesium check.       1. Preeclampsia: Continue IV Magnesium @2G/hr for 24 hrs post delivery until 11:00 2024  Denying toxic symptoms currently.   Antihypertensives: s/p hydral 10 mg, labetalol 200 TID  Continue to monitor blood pressures.   Next Magnesium check @ 2300  Last Magnesium serum level 1.7 . Follow up next level.     2. GI: clear liquid diet    3. : strict Is and Os until discontinuation of IV Magnesium

## 2024-08-03 NOTE — PROGRESS NOTE ADULT - SUBJECTIVE AND OBJECTIVE BOX
Patient evaluated at bedside this morning, resting comfortable in bed. Patient had rectal suppository last night and had two bowel movements. She states she passed flatus with both. First was pebble-like and second was more loose.  She reports pain is well controlled with oral pain medications.   She denies headache, dizziness, chest pain, palpitations, shortness of breath, nausea, vomiting or heavy vaginal bleeding.  She has been ambulating without assistance, voiding spontaneously.    Physical Exam:  Vital Signs Last 24 Hrs  T(C): 36.8 (03 Aug 2024 02:00), Max: 37.1 (02 Aug 2024 21:25)  T(F): 98.2 (03 Aug 2024 02:00), Max: 98.8 (02 Aug 2024 21:25)  HR: 73 (03 Aug 2024 02:00) (73 - 88)  BP: 126/77 (03 Aug 2024 02:00) (113/75 - 143/77)  BP(mean): 101 (02 Aug 2024 18:00) (91 - 101)  RR: 18 (03 Aug 2024 02:00) (17 - 19)  SpO2: 98% (03 Aug 2024 02:00) (98% - 100%)    Parameters below as of 03 Aug 2024 02:00  Patient On (Oxygen Delivery Method): room air        GA: NAD, A+0 x 3  Pulm: no increased WOB  Abd: soft, nontender, nondistended, no rebound or guarding, incision clean, dry and intact, uterus firm at midline, 2 fb below umbilicus. bowel sounds heard and wnl in all four quadrants  Extremities: no swelling or calf tenderness                             9.2    8.34  )-----------( 199      ( 02 Aug 2024 05:30 )             27.9     08-02    136  |  104  |  10  ----------------------------<  70  4.0   |  20<L>  |  0.56    Ca    8.9      02 Aug 2024 05:30  Phos  4.6     08-02  Mg     1.8     08-02           Patient evaluated at bedside this morning, resting comfortable in bed. Patient had rectal suppository last night and had two bowel movements. She states she passed flatus with both. First was pebble-like and second was more loose.  She reports pain is well controlled with oral pain medications.   She denies headache, dizziness, chest pain, palpitations, shortness of breath, nausea, vomiting or heavy vaginal bleeding.  She has been ambulating without assistance, voiding spontaneously.    Physical Exam:  Vital Signs Last 24 Hrs  T(C): 36.8 (03 Aug 2024 02:00), Max: 37.1 (02 Aug 2024 21:25)  T(F): 98.2 (03 Aug 2024 02:00), Max: 98.8 (02 Aug 2024 21:25)  HR: 73 (03 Aug 2024 02:00) (73 - 88)  BP: 126/77 (03 Aug 2024 02:00) (113/75 - 143/77)  BP(mean): 101 (02 Aug 2024 18:00) (91 - 101)  RR: 18 (03 Aug 2024 02:00) (17 - 19)  SpO2: 98% (03 Aug 2024 02:00) (98% - 100%)    Parameters below as of 03 Aug 2024 02:00  Patient On (Oxygen Delivery Method): room air        GA: NAD, A+0 x 3  Pulm: no increased WOB  Abd: soft, nontender, moderately distended, no rebound or guarding, incision clean, dry and intact, uterus firm at midline, 2 fb below umbilicus. bowel sounds heard and wnl in all four quadrants  Extremities: no swelling or calf tenderness                             9.2    8.34  )-----------( 199      ( 02 Aug 2024 05:30 )             27.9     08-02    136  |  104  |  10  ----------------------------<  70  4.0   |  20<L>  |  0.56    Ca    8.9      02 Aug 2024 05:30  Phos  4.6     08-02  Mg     1.8     08-02

## 2024-08-03 NOTE — CHART NOTE - NSCHARTNOTEFT_GEN_A_CORE
I came to the bedside -now on 2 occasions- to assess Mrs. Salazar and to update her on the lab values (LFT's).  Both times she was taking a nap.  VSS  Mrs. Salazar is clinically stable as well as her laboratory values.    Will reassess with next magnesium check up.

## 2024-08-03 NOTE — PROGRESS NOTE ADULT - ASSESSMENT
A/P: 34y s/p  section, POD#4 w/ cHTN and post-operative course c/b ileus, stable  -  Pain: PO motrin q6hrs, tylenol q8hrs, oxycodone for severe pain PRN  -  Post-operatively labs: post-op Hgb stable , hemodynamically stable, no symptoms of anemia   -  : s/p cancino , urinating without difficulty  -  DVT prophylaxis: encouraged increased ambulation, SCDs, SQL  -  Dispo: POD 3 or 4    cHTN  -no toxic complaints  -normotensive on labetalol 100 TID    ileus  -passing flatus and had two bowel movements  -NPO w/ LR at 125 cc/hr  -f/u RUQ sono for hx of ICP  -fuu AM BMP/Mg/Phos and replete PRN

## 2024-08-04 LAB
ALBUMIN SERPL ELPH-MCNC: 2.8 G/DL — LOW (ref 3.3–5)
ALP SERPL-CCNC: 93 U/L — SIGNIFICANT CHANGE UP (ref 40–120)
ALT FLD-CCNC: 32 U/L — SIGNIFICANT CHANGE UP (ref 10–45)
ANION GAP SERPL CALC-SCNC: 17 MMOL/L — SIGNIFICANT CHANGE UP (ref 5–17)
AST SERPL-CCNC: 19 U/L — SIGNIFICANT CHANGE UP (ref 10–40)
BASOPHILS # BLD AUTO: 0.02 K/UL — SIGNIFICANT CHANGE UP (ref 0–0.2)
BASOPHILS NFR BLD AUTO: 0.3 % — SIGNIFICANT CHANGE UP (ref 0–2)
BILIRUB SERPL-MCNC: <0.2 MG/DL — SIGNIFICANT CHANGE UP (ref 0.2–1.2)
BLD GP AB SCN SERPL QL: NEGATIVE — SIGNIFICANT CHANGE UP
BUN SERPL-MCNC: 7 MG/DL — SIGNIFICANT CHANGE UP (ref 7–23)
CALCIUM SERPL-MCNC: 7.4 MG/DL — LOW (ref 8.4–10.5)
CHLORIDE SERPL-SCNC: 104 MMOL/L — SIGNIFICANT CHANGE UP (ref 96–108)
CO2 SERPL-SCNC: 15 MMOL/L — LOW (ref 22–31)
CREAT SERPL-MCNC: 0.63 MG/DL — SIGNIFICANT CHANGE UP (ref 0.5–1.3)
EGFR: 119 ML/MIN/1.73M2 — SIGNIFICANT CHANGE UP
EOSINOPHIL # BLD AUTO: 0.17 K/UL — SIGNIFICANT CHANGE UP (ref 0–0.5)
EOSINOPHIL NFR BLD AUTO: 2.3 % — SIGNIFICANT CHANGE UP (ref 0–6)
GLUCOSE SERPL-MCNC: 75 MG/DL — SIGNIFICANT CHANGE UP (ref 70–99)
HCT VFR BLD CALC: 29.3 % — LOW (ref 34.5–45)
HGB BLD-MCNC: 9.7 G/DL — LOW (ref 11.5–15.5)
IMM GRANULOCYTES NFR BLD AUTO: 4.2 % — HIGH (ref 0–0.9)
LYMPHOCYTES # BLD AUTO: 0.85 K/UL — LOW (ref 1–3.3)
LYMPHOCYTES # BLD AUTO: 11.6 % — LOW (ref 13–44)
MAGNESIUM SERPL-MCNC: 4.9 MG/DL — HIGH (ref 1.6–2.6)
MCHC RBC-ENTMCNC: 28.1 PG — SIGNIFICANT CHANGE UP (ref 27–34)
MCHC RBC-ENTMCNC: 33.1 GM/DL — SIGNIFICANT CHANGE UP (ref 32–36)
MCV RBC AUTO: 84.9 FL — SIGNIFICANT CHANGE UP (ref 80–100)
MONOCYTES # BLD AUTO: 0.9 K/UL — SIGNIFICANT CHANGE UP (ref 0–0.9)
MONOCYTES NFR BLD AUTO: 12.3 % — SIGNIFICANT CHANGE UP (ref 2–14)
NEUTROPHILS # BLD AUTO: 5.06 K/UL — SIGNIFICANT CHANGE UP (ref 1.8–7.4)
NEUTROPHILS NFR BLD AUTO: 69.3 % — SIGNIFICANT CHANGE UP (ref 43–77)
NRBC # BLD: 0 /100 WBCS — SIGNIFICANT CHANGE UP (ref 0–0)
PLATELET # BLD AUTO: 285 K/UL — SIGNIFICANT CHANGE UP (ref 150–400)
POTASSIUM SERPL-MCNC: 4.1 MMOL/L — SIGNIFICANT CHANGE UP (ref 3.5–5.3)
POTASSIUM SERPL-SCNC: 4.1 MMOL/L — SIGNIFICANT CHANGE UP (ref 3.5–5.3)
PROT SERPL-MCNC: 5.6 G/DL — LOW (ref 6–8.3)
RBC # BLD: 3.45 M/UL — LOW (ref 3.8–5.2)
RBC # FLD: 14.5 % — SIGNIFICANT CHANGE UP (ref 10.3–14.5)
RH IG SCN BLD-IMP: POSITIVE — SIGNIFICANT CHANGE UP
SODIUM SERPL-SCNC: 136 MMOL/L — SIGNIFICANT CHANGE UP (ref 135–145)
WBC # BLD: 7.31 K/UL — SIGNIFICANT CHANGE UP (ref 3.8–10.5)
WBC # FLD AUTO: 7.31 K/UL — SIGNIFICANT CHANGE UP (ref 3.8–10.5)

## 2024-08-04 RX ADMIN — Medication 200 MILLIGRAM(S): at 10:23

## 2024-08-04 RX ADMIN — PETROLATUM 1 APPLICATION(S): 87.32 OINTMENT TOPICAL at 07:18

## 2024-08-04 RX ADMIN — SIMETHICONE 80 MILLIGRAM(S): 125 TABLET, CHEWABLE ORAL at 10:23

## 2024-08-04 RX ADMIN — Medication 975 MILLIGRAM(S): at 18:29

## 2024-08-04 RX ADMIN — Medication 975 MILLIGRAM(S): at 07:00

## 2024-08-04 RX ADMIN — Medication 200 MILLIGRAM(S): at 18:29

## 2024-08-04 RX ADMIN — Medication 50 GM/HR: at 08:21

## 2024-08-04 RX ADMIN — PETROLATUM 1 APPLICATION(S): 87.32 OINTMENT TOPICAL at 18:30

## 2024-08-04 RX ADMIN — Medication 975 MILLIGRAM(S): at 12:33

## 2024-08-04 RX ADMIN — SIMETHICONE 80 MILLIGRAM(S): 125 TABLET, CHEWABLE ORAL at 19:46

## 2024-08-04 RX ADMIN — Medication 200 MILLIGRAM(S): at 02:01

## 2024-08-04 RX ADMIN — ENOXAPARIN SODIUM 40 MILLIGRAM(S): 120 INJECTION SUBCUTANEOUS at 20:33

## 2024-08-04 NOTE — PROGRESS NOTE ADULT - SUBJECTIVE AND OBJECTIVE BOX
Patient evaluated at bedside this morning, resting comfortable in bed. She is feeling better; diet advanced and is is tolerating, continues to pass flatus. No complaints on IV magnesium.  She reports pain is well controlled with oral pain medications.   She denies heavy vaginal bleeding. She denies headache, scotoma, chest pain, shortness of breath, RUQ/epigastric pain.  She has been ambulating without assistance, voiding spontaneously, passing gas, tolerating regular diet.     Physical Exam:  Vital Signs Last 24 Hrs  T(C): 37.1 (04 Aug 2024 06:00), Max: 37.2 (04 Aug 2024 04:30)  T(F): 98.8 (04 Aug 2024 06:00), Max: 99 (04 Aug 2024 04:30)  HR: 80 (04 Aug 2024 06:00) (58 - 88)  BP: 148/84 (04 Aug 2024 06:00) (111/68 - 166/96)  BP(mean): 89 (03 Aug 2024 13:15) (80 - 119)  RR: 18 (04 Aug 2024 06:00) (16 - 19)  SpO2: 98% (04 Aug 2024 06:00) (96% - 99%)    Parameters below as of 04 Aug 2024 06:00  Patient On (Oxygen Delivery Method): room air        GA: well-appearing, NAD  Pulm: no increased WOB  Abd: soft, nontender, no rebound or guarding, incision clean, dry and intact, uterus firm at midline,  fb below umbilicus  Extremities: no calf tenderness                             9.7    7.31  )-----------( 285      ( 04 Aug 2024 04:30 )             29.3     08-04    136  |  104  |  7   ----------------------------<  75  4.1   |  15<L>  |  0.63    Ca    7.4<L>      04 Aug 2024 04:30  Phos  4.1     08-03  Mg     4.9     08-04    TPro  5.6<L>  /  Alb  2.8<L>  /  TBili  <0.2  /  DBili  x   /  AST  19  /  ALT  32  /  AlkPhos  93  08-04    Magnesium: 4.9 mg/dL (08-04 @ 04:30)  Magnesium: 4.6 mg/dL (08-03 @ 22:29)       Patient evaluated at bedside this morning, resting comfortable in bed. She is feeling overall better; had broth last night for dinner. Reports small BM at 0500. Continues to pass flatus but feels it is less. Not ambulating due to IV magnesium but looking forward to ambulation after it stops at 1100. No complaints on IV magnesium.  She reports pain is well controlled with oral pain medications.   She denies heavy vaginal bleeding. She denies headache, scotoma, chest pain, shortness of breath, RUQ/epigastric pain.  Voiding without issues.    Physical Exam:  Vital Signs Last 24 Hrs  T(C): 37.1 (04 Aug 2024 06:00), Max: 37.2 (04 Aug 2024 04:30)  T(F): 98.8 (04 Aug 2024 06:00), Max: 99 (04 Aug 2024 04:30)  HR: 80 (04 Aug 2024 06:00) (58 - 88)  BP: 148/84 (04 Aug 2024 06:00) (111/68 - 166/96)  BP(mean): 89 (03 Aug 2024 13:15) (80 - 119)  RR: 18 (04 Aug 2024 06:00) (16 - 19)  SpO2: 98% (04 Aug 2024 06:00) (96% - 99%)    Parameters below as of 04 Aug 2024 06:00  Patient On (Oxygen Delivery Method): room air        GA: well-appearing, NAD  Pulm: no increased WOB  Abd: soft, nontender, no rebound or guarding, incision clean, dry and intact, uterus firm at midline,  fb below umbilicus  Extremities: no calf tenderness                             9.7    7.31  )-----------( 285      ( 04 Aug 2024 04:30 )             29.3     08-04    136  |  104  |  7   ----------------------------<  75  4.1   |  15<L>  |  0.63    Ca    7.4<L>      04 Aug 2024 04:30  Phos  4.1     08-03  Mg     4.9     08-04    TPro  5.6<L>  /  Alb  2.8<L>  /  TBili  <0.2  /  DBili  x   /  AST  19  /  ALT  32  /  AlkPhos  93  08-04    Magnesium: 4.9 mg/dL (08-04 @ 04:30)  Magnesium: 4.6 mg/dL (08-03 @ 22:29)

## 2024-08-04 NOTE — PROGRESS NOTE ADULT - ASSESSMENT
A/P: 33 yo  s/p pCS and myomectomy at 37w1d for failed IOL c/b chronic HTN/ICP (EBL 1000) c/b postop ileus and superimposed preeclampsia with severe features), POD#5  -  Pain: PO motrin q6hrs, tylenol q8hrs, oxycodone for severe pain PRN  - superimposed PEC w/ SF (BP); On 24h IV magnesium (to finish 1100), no toxic complaints, BP normotensive to mild range on labetalol 200 TID - consider uptitrating again if persistently mildrange  -  Post-operatively labs: post-op Hgb 9.7, hemodynamically stable, no symptoms of acute blood loss anemia   -  GI: postop ileus resolving with conservative management, diet advanced and tolerating well, passing flatus. Appreciate GS recs. Holding oxycodone  -  : s/p cancino , urinating without difficulty  -  DVT prophylaxis: encouraged increased ambulation, SCDs, SQL  -  Dispo: once stabilized  A/P: 33 yo  s/p pCS and myomectomy at 37w1d for failed IOL c/b chronic HTN/ICP (EBL 1000) c/b postop ileus and superimposed preeclampsia with severe features), POD#5  -  Pain: PO motrin q6hrs, tylenol q8hrs, oxycodone for severe pain PRN  - superimposed PEC w/ SF (BP); On 24h IV magnesium (to finish 1100), no toxic complaints, BP normotensive to mild range on labetalol 200 TID - consider uptitrating again if persistently mildrange  -  Post-operatively labs: post-op Hgb 9.7, hemodynamically stable, no symptoms of acute blood loss anemia   -  GI: postop ileus resolving with conservative management, advanced to regular diet this AM, passing flatus and +BM. Appreciate GS recs. Holding oxycodone  -  : s/p cancino , urinating without difficulty  -  DVT prophylaxis: encouraged increased ambulation, SCDs, SQL  -  Dispo: once stabilized

## 2024-08-05 LAB — SURGICAL PATHOLOGY STUDY: SIGNIFICANT CHANGE UP

## 2024-08-05 PROCEDURE — 99253 IP/OBS CNSLTJ NEW/EST LOW 45: CPT

## 2024-08-05 RX ORDER — LABETALOL HCL 200 MG
400 TABLET ORAL EVERY 8 HOURS
Refills: 0 | Status: DISCONTINUED | OUTPATIENT
Start: 2024-08-05 | End: 2024-08-05

## 2024-08-05 RX ORDER — NIFEDIPINE 20 MG/1
30 CAPSULE, LIQUID FILLED ORAL EVERY 24 HOURS
Refills: 0 | Status: DISCONTINUED | OUTPATIENT
Start: 2024-08-05 | End: 2024-08-06

## 2024-08-05 RX ORDER — LABETALOL HCL 200 MG
40300 TABLET ORAL EVERY 8 HOURS
Refills: 0 | Status: DISCONTINUED | OUTPATIENT
Start: 2024-08-05 | End: 2024-08-05

## 2024-08-05 RX ORDER — LABETALOL HCL 200 MG
400 TABLET ORAL EVERY 8 HOURS
Refills: 0 | Status: DISCONTINUED | OUTPATIENT
Start: 2024-08-05 | End: 2024-08-06

## 2024-08-05 RX ORDER — LABETALOL HCL 200 MG
300 TABLET ORAL EVERY 8 HOURS
Refills: 0 | Status: DISCONTINUED | OUTPATIENT
Start: 2024-08-05 | End: 2024-08-05

## 2024-08-05 RX ORDER — LABETALOL HCL 200 MG
100 TABLET ORAL ONCE
Refills: 0 | Status: COMPLETED | OUTPATIENT
Start: 2024-08-05 | End: 2024-08-05

## 2024-08-05 RX ORDER — HYDRALAZINE HYDROCHLORIDE 100 MG/1
10 TABLET ORAL ONCE
Refills: 0 | Status: COMPLETED | OUTPATIENT
Start: 2024-08-05 | End: 2024-08-05

## 2024-08-05 RX ADMIN — Medication 975 MILLIGRAM(S): at 17:32

## 2024-08-05 RX ADMIN — Medication 975 MILLIGRAM(S): at 06:15

## 2024-08-05 RX ADMIN — NIFEDIPINE 30 MILLIGRAM(S): 20 CAPSULE, LIQUID FILLED ORAL at 06:14

## 2024-08-05 RX ADMIN — Medication 975 MILLIGRAM(S): at 00:15

## 2024-08-05 RX ADMIN — Medication 400 MILLIGRAM(S): at 16:43

## 2024-08-05 RX ADMIN — SIMETHICONE 80 MILLIGRAM(S): 125 TABLET, CHEWABLE ORAL at 00:15

## 2024-08-05 RX ADMIN — Medication 100 MILLIGRAM(S): at 01:57

## 2024-08-05 RX ADMIN — Medication 975 MILLIGRAM(S): at 12:19

## 2024-08-05 RX ADMIN — Medication 975 MILLIGRAM(S): at 23:18

## 2024-08-05 RX ADMIN — HYDRALAZINE HYDROCHLORIDE 10 MILLIGRAM(S): 100 TABLET ORAL at 01:25

## 2024-08-05 RX ADMIN — Medication 400 MILLIGRAM(S): at 20:48

## 2024-08-05 RX ADMIN — Medication 200 MILLIGRAM(S): at 01:06

## 2024-08-05 RX ADMIN — SIMETHICONE 80 MILLIGRAM(S): 125 TABLET, CHEWABLE ORAL at 06:14

## 2024-08-05 RX ADMIN — PETROLATUM 1 APPLICATION(S): 87.32 OINTMENT TOPICAL at 06:52

## 2024-08-05 RX ADMIN — HYDRALAZINE HYDROCHLORIDE 10 MILLIGRAM(S): 100 TABLET ORAL at 09:05

## 2024-08-05 RX ADMIN — SIMETHICONE 80 MILLIGRAM(S): 125 TABLET, CHEWABLE ORAL at 20:48

## 2024-08-05 RX ADMIN — Medication 300 MILLIGRAM(S): at 08:41

## 2024-08-05 RX ADMIN — ENOXAPARIN SODIUM 40 MILLIGRAM(S): 120 INJECTION SUBCUTANEOUS at 20:48

## 2024-08-05 NOTE — CONSULT NOTE ADULT - ATTENDING COMMENTS
Patient is a 33 yo  yo F with PMHx of SVT, HTN, Pre-eclampsia, presenting for primary  section for failed IOL for chronic HTN c/b postop ileus, now resolved)and superimposed preeclampsia with severe feature (BP), for which cardiology was consulted    Review of Studies:   -TTE 05/10/2024: Left ventricular cavity is normal in size. Left ventricular wall thickness is normal. Left ventricular systolic function is normal with an ejection fraction of 63 % by Botello's method of disks. There are no regional wall motion abnormalities seen. Normal left ventricular diastolic function, with normal filling pressure. Normal right ventricular cavity size, with normal wall thickness, and normal systolic function. Mildly dilated left atrium. No significant valvular disease.    Home Medications: Labetalol 200mg BID    Primary Cardiologist: Eliza Shepherd MD    # Pre-Eclampsia with severe Features in patient with Chronic HTN  # Hx SVT  - Patient has Hx of Pre-Eclampsia and chronic HTN  - Echo performed in 2024 revealed normal BIV function without significant valvular heart disease.   - She presented for primary  section for failed IOL for chronic HTN with post operative course notable for ileus and superimposed preeclampsia with severe feature, with SBP as high as 166/91  - Labs reviewed showing normal Platelets, LFTS, normal PC ratio of 0.1  - At this time would optimize patient with the following  - Would Increase labetalol to 400mg TID, with strict holding parameters SBP <100 and HR <60.   - Would continue with nifedipine XL 30mg daily.   - If despite aforementioned regimen, SBP goal <140/90 not achieved, would increase nifedipine XL to 30mg po BID  - Favor uptitration of oral regimen over IVP for BP control unless SBP in severe range  - Cardiology will cont to follow with you, please call with any questions

## 2024-08-05 NOTE — PROGRESS NOTE ADULT - ASSESSMENT
A/P: 34y s/p primary  section for failed IOL for chronic HTN c/b postop ileus (resolved) and superimposed preeclampsia with severe feature (BP), POD#6  -  Pain: PO motrin q6hrs, tylenol q8hrs, oxycodone for severe pain PRN  - superimposed PEC w/ SF (BP): s/p IV magnesium x24hrs, sustained severe range BP overnight - increased labetalol to 300 TID and will start nifedipine 30 this AM. Denies toxic complaints. Monitor BP and toxic complaints   -  Post-operatively labs: post-op Hgb , hemodynamically stable, no symptoms of acute blood loss anemia   -  GI: ileus resolved, now passing flatus/having BM tolerating regular diet  -  : s/p cancino , urinating without difficulty  -  DVT prophylaxis: encouraged increased ambulation, SCDs, SQL  -  Dispo: once stabilized

## 2024-08-05 NOTE — CHART NOTE - NSCHARTNOTEFT_GEN_A_CORE
Patient seen at bedside after a call from nurse reporting severe range BP (164/90, 15 min repeat 166/91). Hydralazine 10 was pushed at 01:25. Patient denies headache, scotoma, SOB, n/v, RUQ pain. Plan to retake BP in twenty minutes and reevaluate escalating hypertensive meds regimen. Attending being contacted.    Discussed with Dr. Reba Solorio PGY3 Patient seen at bedside after a call from nurse reporting severe range BP (164/90, 15 min repeat 166/91). Hydralazine 10 was pushed at 01:25. Patient denies headache, scotoma, SOB, n/v, RUQ pain. Plan to retake BP in twenty minutes and reevaluate escalating hypertensive meds regimen. Attending being contacted.    Discussed with Dr. Reba Solorio PGY3    Addendum   20 min /81. Plan for hypertensive regimen pending discussion with attending.

## 2024-08-05 NOTE — PROGRESS NOTE ADULT - ASSESSMENT
Patient is a 35 yo female with a PMHx of hypertension and cholestasis of pregnancy and PSHx of open inguinal hernia repair as a child who is post-op day 3 from a  with increased abdominal pain and bloating. Presentation is most consistent with ileus and there is no sign of bowel obstruction at this time given CT and patient has been tolerating well, passing flatus and having bowel movements. Abdominal US with no signs of cholelithiasis or cholecystitis, showing perihepatic and perinephric ascites.    Rest of are as per primary    Surgery will sign off on this patient

## 2024-08-05 NOTE — PROGRESS NOTE ADULT - SUBJECTIVE AND OBJECTIVE BOX
INTERVAL HPI/OVERNIGHT EVENTS: BROOKE    SUBJECTIVE:  Patient seen and examined by chief resident and team on AM rounds. Afebrile. Refers mild TTP on upper abdomen. No complaints of nausea or vomiting. Passing flatus and having bowel movements. Tolerating diet.     MEDICATIONS  (STANDING):  acetaminophen     Tablet .. 975 milliGRAM(s) Oral <User Schedule>  diphtheria/tetanus/pertussis (acellular) Vaccine (Adacel) 0.5 milliLiter(s) IntraMuscular once  enoxaparin Injectable 40 milliGRAM(s) SubCutaneous every 24 hours  labetalol 300 milliGRAM(s) Oral every 8 hours  NIFEdipine XL 30 milliGRAM(s) Oral every 24 hours  oxytocin Infusion 333.333 milliUNIT(s)/Min (1000 mL/Hr) IV Continuous <Continuous>  prenatal multivitamin 1 Tablet(s) Oral daily  sodium chloride 0.9%. 1000 milliLiter(s) (75 mL/Hr) IV Continuous <Continuous>  zinc oxide 20% Ointment 1 Application(s) Topical two times a day    MEDICATIONS  (PRN):  diphenhydrAMINE 25 milliGRAM(s) Oral every 6 hours PRN Pruritus  lanolin Ointment 1 Application(s) Topical every 6 hours PRN Sore Nipples  magnesium hydroxide Suspension 30 milliLiter(s) Oral two times a day PRN Constipation  oxyCODONE    IR 5 milliGRAM(s) Oral once PRN Moderate to Severe Pain (4-10)  simethicone 80 milliGRAM(s) Chew every 4 hours PRN Gas      Vital Signs Last 24 Hrs  T(C): 36.7 (05 Aug 2024 09:25), Max: 37.2 (04 Aug 2024 21:00)  T(F): 98 (05 Aug 2024 09:25), Max: 99 (04 Aug 2024 21:00)  HR: 80 (05 Aug 2024 09:25) (66 - 85)  BP: 151/81 (05 Aug 2024 09:25) (125/73 - 169/94)  BP(mean): 108 (04 Aug 2024 18:28) (103 - 108)  RR: 20 (05 Aug 2024 09:25) (18 - 20)  SpO2: 97% (05 Aug 2024 09:25) (97% - 100%)    Parameters below as of 05 Aug 2024 09:25  Patient On (Oxygen Delivery Method): room air        PHYSICAL EXAM:    Constitutional: A&Ox3, NAD  Respiratory: non labored breathing, no respiratory distress  Cardiovascular: NSR, RRR  Gastrointestinal: soft, mild TTP on upper abdomen, distention related to distended uterus. No guarding or rigidity.                  Incision: Pfannenstiel insicion  Genitourinary: Voiding freely  Extremities: (-) edema    I&O's Detail    04 Aug 2024 07:01  -  05 Aug 2024 07:00  --------------------------------------------------------  IN:    Magnesium Sulfate: 150 mL    sodium chloride 0.9%: 225 mL  Total IN: 375 mL    OUT:    Voided (mL): 1350 mL  Total OUT: 1350 mL    Total NET: -975 mL          LABS:                        9.7    7.31  )-----------( 285      ( 04 Aug 2024 04:30 )             29.3     08-04    136  |  104  |  7   ----------------------------<  75  4.1   |  15<L>  |  0.63    Ca    7.4<L>      04 Aug 2024 04:30  Mg     4.9     08-04    TPro  5.6<L>  /  Alb  2.8<L>  /  TBili  <0.2  /  DBili  x   /  AST  19  /  ALT  32  /  AlkPhos  93  08-04      Urinalysis Basic - ( 04 Aug 2024 04:30 )    Color: x / Appearance: x / SG: x / pH: x  Gluc: 75 mg/dL / Ketone: x  / Bili: x / Urobili: x   Blood: x / Protein: x / Nitrite: x   Leuk Esterase: x / RBC: x / WBC x   Sq Epi: x / Non Sq Epi: x / Bacteria: x        RADIOLOGY & ADDITIONAL STUDIES:

## 2024-08-05 NOTE — CONSULT NOTE ADULT - ASSESSMENT
Assessment: 34  y/o F with PMHx of SVT, HTN, pre-eclampsia, presenting for IOL, now s/p  POD6, however now course c/b worsening HTN while on labetalol 300mg TID, cardiology consulted for management of HTN.    Review of Studies:  CORONARY:             -STRESS:             -CCTA:             -LHC:             -RHC:  PUMP/VALVE:             -TTE: LVEF 63% with normal thickness, mild LA dilation  EP:            -EKG: NSR  OTHER:    Home Medications: Labetalol 200mg BID    Primary Cardiologist: Eliza Shepherd MD    Plan:   #Primary HTN  #Hx SVT  -Increase labetalol to 400mg TID, goal SBP <140, hold for SBP <100 and HR <60.   -C/w nifedipine XL 30mg daily. If goal SBP <140 not achieved despite higher dose of labetalol, can increase to nifedipine XL 30mg BID    Kendrick Wise PGY4 CVD Fellow  Cardiology Consult Pager: [phone]  CCU Pager: [phone]  Heart Failure Pager: [phone]  
Patient is a 35 yo female with a PMHx of hypertension and cholestasis of pregnancy and PSHx of open inguinal hernia repair as a child who is post-op day 3 from a  with increased abdominal pain and bloating. Presentation is most consistent with ileus and there is no sign of bowel obstruction at this time given CT and patient is now passing flatus. Will further evaluate RUQ pain given history of cholestasis of pregnancy but currently no suspicion of infectious etiology with normal white count and stable vitals.    Recommendations:  RUQ Ultrasound  Advance diet as tolerated  rest of care as per primary

## 2024-08-05 NOTE — CONSULT NOTE ADULT - SUBJECTIVE AND OBJECTIVE BOX
Cardiology Consult      HPI:  35yo  at 37w0d presenting for IOL for cHTN and ICP. - LOF - VB - CTX +FM. Reports mild 2/10 frontal headache that started when arriving at the hospital. Denies scotoma, RUQ/epigastric pain, SOB, n/v.    Ante: Spontaneous pregnancy. NIPT and anatomy scan wnl. Passed GCT.   #cHTN: Diagnosed n the first trimester and started on labetalol 100 QD. Due to poorly controlled BPs, labetalol was increased to 400 TID at week 28 and decreased back to 100 TID which is her current regimen. Reports BPs at home to ne 120s/60s. Never had a severe range BP. 24hr urine protein 314  and 200 .   #ICP: Diagnosed at week 26. On ursodiol 200 BID.  bile acids 19.5  #SVT: Diagnosed by holter monitor in the second trimester. Followed with cardio for the rest of the pregnancy. Has been asymptomatic on labetalol.   EFW 3090g  GBS negative    OBHX: G1    GynHX: 3 fibroids 3-4 cm. Denies ovarian cysts, abnormal pap smear, STI/herpes.   MedHX: denies  Surghx: linguinal hernia repair, lap, ; hysteroscopic uterine polypectomy , ,   Medications: labetalol 100 TID, ursodiol 200 BID, baby aspirin 1 tablet daily (last taken yesterday), PNV   Allergies: NKDA    Pt had uneventful . Today POD6, pt had worsening HTN to 170-180s systolic while on labetalol 300mg TID. She was started on nifedipine 30mg XL daily, of which BPs improved to 151 systolics. She endorses HA but denies CP, SOB, palpitations. She denies nausea, vomiting. She also notes hx of SVT of which she was following with Dr Shepherd, SVTs were found via an event monitor and was on 400mg TID as an outpatient, however she became hypotensive during her pregnancy at which her labetalol dose decreased to 200mg BID which addressed both her HTN and SVT.     Physical Exam:  T(C): 36.7 (24 @ 21:36), Max: 36.7 (24 @ 21:36)  HR: 68 (24 @ 21:36) (68 - 68)  BP: 130/69 (24 @ 21:36) (130/69 - 130/69)  RR: 17 (24 @ 21:36) (17 - 17)  SpO2: 99% (24 @ 21:36) (99% - 99%)    General: NAD  Pulm: no increased WOB  Abdomen: soft, gravid, nontender  Extremities: wnl     TAUS: Cephalic  VE: FT/long    EFM: 135 bpm, mod variability, + accels, - decels; reactive and reassuring  [place]: Uterine irritability with single contraction                (2024 23:10)        Pt seen and examined at bedside, NAD, denies chest pain/pressure/discomfort. ROS s/f ?,      Review of Systems:  CONSTITUTIONAL:  No weight loss, fever, chills, weakness or fatigue.  HEENT:  Eyes:  No visual loss, blurred vision, double vision or yellow sclerae. Ears, Nose, Throat:  No hearing loss, sneezing, congestion, runny nose or sore throat.  SKIN:  No rash or itching.  CARDIOVASCULAR:  No chest pain, chest pressure or chest discomfort. No palpitations or edema.  RESPIRATORY:  No shortness of breath, cough or sputum.  GASTROINTESTINAL:  No anorexia, nausea, vomiting or diarrhea. No abdominal pain or blood.  GENITOURINARY: No Burning on urination.   NEUROLOGICAL:  see HPI  MUSCULOSKELETAL:  No muscle, back pain, joint pain or stiffness.  HEMATOLOGIC:  No anemia, bleeding or bruising.  LYMPHATICS:  No enlarged nodes. No history of splenectomy.  PSYCHIATRIC:  No history of depression or anxiety.  ENDOCRINOLOGIC:  No reports of sweating, cold or heat intolerance. No polyuria or polydipsia.  ALLERGIES:  No history of asthma, hives, eczema or rhinitis.    PAST MEDICAL & SURGICAL HISTORY:  Hypertension      SVT (supraventricular tachycardia)      History of cholestasis during pregnancy        SOCIAL HISTORY:  FAMILY HISTORY:    ALLERGIES: 	  No Known Drug Allergies  Nuts (Other)          MEDICATIONS:  acetaminophen     Tablet .. 975 milliGRAM(s) Oral <User Schedule>  diphenhydrAMINE 25 milliGRAM(s) Oral every 6 hours PRN  diphtheria/tetanus/pertussis (acellular) Vaccine (Adacel) 0.5 milliLiter(s) IntraMuscular once  enoxaparin Injectable 40 milliGRAM(s) SubCutaneous every 24 hours  labetalol 400 milliGRAM(s) Oral every 8 hours  lanolin Ointment 1 Application(s) Topical every 6 hours PRN  magnesium hydroxide Suspension 30 milliLiter(s) Oral two times a day PRN  NIFEdipine XL 30 milliGRAM(s) Oral every 24 hours  oxyCODONE    IR 5 milliGRAM(s) Oral once PRN  oxytocin Infusion 333.333 milliUNIT(s)/Min IV Continuous <Continuous>  prenatal multivitamin 1 Tablet(s) Oral daily  simethicone 80 milliGRAM(s) Chew every 4 hours PRN  sodium chloride 0.9%. 1000 milliLiter(s) IV Continuous <Continuous>  zinc oxide 20% Ointment 1 Application(s) Topical two times a day      T(C): 37.4 (24 @ 14:00), Max: 37.4 (24 @ 14:00)  HR: 95 (24 @ 14:00) (71 - 95)  BP: 154/83 (24 @ 14:00) (125/73 - 169/94)  RR: 19 (24 @ 14:00) (18 - 20)  SpO2: 98% (24 @ 14:00) (97% - 100%)    24 @ 07:01  -  24 @ 07:00  --------------------------------------------------------  IN: 375 mL / OUT: 1350 mL / NET: -975 mL        PHYSICAL EXAM:    Constitutional: resting comfortably in bed; NAD  HEENT: NC/AT, PERRL, EOMI, anicteric sclera, no nasal discharge; uvula midline, no oropharyngeal erythema or exudates; MMM  Neck: supple; no thyromegaly, JVP ? cm H20, JVD +/-  Respiratory: CTA B/L; no W/R/R, no retractions  Cardiac: +S1/S2; RRR, (+) systolic ejection murmur  Gastrointestinal: soft, NT/ND; no rebound or guarding; +BSx4  Extremities: WWP, no clubbing or cyanosis; no peripheral edema  Musculoskeletal: NROM x4; no joint swelling, tenderness or erythema  Vascular: 2+ radial, DP/PT pulses B/L  Dermatologic: skin warm, dry and intact; no rashes, wounds, or scars  Lymphatic: no submandibular or cervical LAD  Neurologic: AAOx3; CNII-XII grossly intact; no focal deficits        I&O's Summary    04 Aug 2024 07:01  -  05 Aug 2024 07:00  --------------------------------------------------------  IN: 375 mL / OUT: 1350 mL / NET: -975 mL        LABS:	 	                        9.7    7.31  )-----------( 285      ( 04 Aug 2024 04:30 )             29.3         136  |  104  |  7   ----------------------------<  75  4.1   |  15<L>  |  0.63    Ca    7.4<L>      04 Aug 2024 04:30  Mg     4.9         TPro  5.6<L>  /  Alb  2.8<L>  /  TBili  <0.2  /  DBili  x   /  AST  19  /  ALT  32  /  AlkPhos  93

## 2024-08-05 NOTE — CHART NOTE - NSCHARTNOTEFT_GEN_A_CORE
Pt is known siPECwSF s/p Mg 8/3-, on Labetalol 300 mg TID and nifedipine mg 30 qd. She previously received hydral 10 IVP 8/3, hydral 10 IVP .     MD at bedside for RN call of 169/94 at 0828. RN instructed to take BP again in 15 mins. Pt received nifedpine 30 mg PO this morning. Decision made to give PO Labetalol 300 mg STAT originally scheduled at 1000. Repeat pressure 166/82. Hydralazine 10 mg IVP over 2 min was given at 0905. Pt tolerated well. Repeat pressure for 09, d/w RN.     Denies HA, vision changes, SOB, CP. Endorses improving RUQ/epigastric pain from prior exams in setting of known ileus.   Per RN - CTA B/L denies toxic symptoms.     PE   NAD   Pulm: NAD  GI: RUQ/epigastric mild tenderness consistent with known abdominal pain from ileus   Ext: no LE edema/tenderness.       35 yo  POD6 s/p pCS and myomectomy with known siPECwSF s/p IV Mg, with severe range pressures, s/p hydral 10 IVP  - Cardiology was consulted for additional recs.     - D/w Dr. Biswas PGY4 and Dr. Silva attending. Pt is known siPECwSF s/p Mg 8/3-, on Labetalol 300 mg TID and nifedipine mg 30 qd. She previously received hydral 10 IVP 8/3, hydral 10 IVP .     MD at bedside for RN call of 169/94 at 0828. RN instructed to take BP again in 15 mins. Pt received nifedpine 30 mg PO this morning. Decision made to give PO Labetalol 300 mg STAT originally scheduled at 1000. Repeat pressure 166/82. Hydralazine 10 mg IVP over 2 min was given at 0905. Pt tolerated well. Repeat pressure for 09, d/w RN. Repeat pressure 151/81    Denies HA, vision changes, SOB, CP. Endorses improving RUQ/epigastric pain from prior exams in setting of known ileus.   Per RN - CTA B/L denies toxic symptoms.     PE   NAD   CV NRR S1 S2   Pulm: NAD, CTA BL   GI: RUQ/epigastric mild tenderness consistent with known abdominal pain from ileus   Ext: no LE edema/tenderness.       35 yo  POD6 s/p pCS and myomectomy with known siPECwSF s/p IV Mg, with severe range pressures, s/p hydral 10 IVP  - Cardiology was consulted for additional recs.     - D/w Dr. Biswas PGY4 and Dr. Silva attending.

## 2024-08-05 NOTE — PROGRESS NOTE ADULT - SUBJECTIVE AND OBJECTIVE BOX
Patient evaluated at bedside this morning, resting comfortable in bed. She reports pain is better, abdomen less distended. She is passing flatus and had BM yesterday and is now tolerating a regular diet.  She denies heavy vaginal bleeding. She denies headache, scotoma, chest pain, shortness of breath, RUQ/epigastric pain.  She has been ambulating without assistance, voiding spontaneously.     Physical Exam:  Vital Signs Last 24 Hrs  T(C): 37.1 (05 Aug 2024 01:00), Max: 37.2 (04 Aug 2024 21:00)  T(F): 98.8 (05 Aug 2024 01:00), Max: 99 (04 Aug 2024 21:00)  HR: 77 (05 Aug 2024 03:44) (66 - 85)  BP: 129/77 (05 Aug 2024 04:44) (125/73 - 166/91)  BP(mean): 108 (04 Aug 2024 18:28) (103 - 108)  RR: 18 (05 Aug 2024 01:15) (16 - 19)  SpO2: 98% (05 Aug 2024 01:15) (98% - 100%)    Parameters below as of 04 Aug 2024 18:28  Patient On (Oxygen Delivery Method): room air        GA: well-appearing, NAD  Pulm: no increased WOB  Abd: soft, nontender, no rebound or guarding, incision clean, dry and intact, uterus firm at midline,  fb below umbilicus  Extremities: no calf tenderness                             9.7    7.31  )-----------( 285      ( 04 Aug 2024 04:30 )             29.3     08-04    136  |  104  |  7   ----------------------------<  75  4.1   |  15<L>  |  0.63    Ca    7.4<L>      04 Aug 2024 04:30  Mg     4.9     08-04    TPro  5.6<L>  /  Alb  2.8<L>  /  TBili  <0.2  /  DBili  x   /  AST  19  /  ALT  32  /  AlkPhos  93  08-04

## 2024-08-06 ENCOUNTER — TRANSCRIPTION ENCOUNTER (OUTPATIENT)
Age: 34
End: 2024-08-06

## 2024-08-06 VITALS
TEMPERATURE: 98 F | RESPIRATION RATE: 18 BRPM | DIASTOLIC BLOOD PRESSURE: 83 MMHG | OXYGEN SATURATION: 98 % | SYSTOLIC BLOOD PRESSURE: 138 MMHG | HEART RATE: 74 BPM

## 2024-08-06 DIAGNOSIS — K56.7 ILEUS, UNSPECIFIED: ICD-10-CM

## 2024-08-06 PROCEDURE — 83615 LACTATE (LD) (LDH) ENZYME: CPT

## 2024-08-06 PROCEDURE — 59050 FETAL MONITOR W/REPORT: CPT

## 2024-08-06 PROCEDURE — 84156 ASSAY OF PROTEIN URINE: CPT

## 2024-08-06 PROCEDURE — 84100 ASSAY OF PHOSPHORUS: CPT

## 2024-08-06 PROCEDURE — 80048 BASIC METABOLIC PNL TOTAL CA: CPT

## 2024-08-06 PROCEDURE — 86780 TREPONEMA PALLIDUM: CPT

## 2024-08-06 PROCEDURE — 80053 COMPREHEN METABOLIC PANEL: CPT

## 2024-08-06 PROCEDURE — 85025 COMPLETE CBC W/AUTO DIFF WBC: CPT

## 2024-08-06 PROCEDURE — 83735 ASSAY OF MAGNESIUM: CPT

## 2024-08-06 PROCEDURE — C1765: CPT

## 2024-08-06 PROCEDURE — 74176 CT ABD & PELVIS W/O CONTRAST: CPT | Mod: MC

## 2024-08-06 PROCEDURE — 99232 SBSQ HOSP IP/OBS MODERATE 35: CPT

## 2024-08-06 PROCEDURE — 80076 HEPATIC FUNCTION PANEL: CPT

## 2024-08-06 PROCEDURE — 86900 BLOOD TYPING SEROLOGIC ABO: CPT

## 2024-08-06 PROCEDURE — C1889: CPT

## 2024-08-06 PROCEDURE — 86901 BLOOD TYPING SEROLOGIC RH(D): CPT

## 2024-08-06 PROCEDURE — 82570 ASSAY OF URINE CREATININE: CPT

## 2024-08-06 PROCEDURE — 86850 RBC ANTIBODY SCREEN: CPT

## 2024-08-06 PROCEDURE — 76705 ECHO EXAM OF ABDOMEN: CPT

## 2024-08-06 PROCEDURE — 84550 ASSAY OF BLOOD/URIC ACID: CPT

## 2024-08-06 PROCEDURE — 36415 COLL VENOUS BLD VENIPUNCTURE: CPT

## 2024-08-06 PROCEDURE — 74019 RADEX ABDOMEN 2 VIEWS: CPT

## 2024-08-06 PROCEDURE — 85384 FIBRINOGEN ACTIVITY: CPT

## 2024-08-06 RX ORDER — NIFEDIPINE 20 MG/1
1 CAPSULE, LIQUID FILLED ORAL
Qty: 30 | Refills: 0
Start: 2024-08-06 | End: 2024-09-04

## 2024-08-06 RX ORDER — ACETAMINOPHEN 500 MG
3 TABLET ORAL
Qty: 0 | Refills: 0 | DISCHARGE
Start: 2024-08-06

## 2024-08-06 RX ORDER — LABETALOL HCL 200 MG
2 TABLET ORAL
Qty: 180 | Refills: 0
Start: 2024-08-06 | End: 2024-09-04

## 2024-08-06 RX ADMIN — Medication 1 TABLET(S): at 11:34

## 2024-08-06 RX ADMIN — Medication 975 MILLIGRAM(S): at 05:47

## 2024-08-06 RX ADMIN — Medication 400 MILLIGRAM(S): at 04:41

## 2024-08-06 RX ADMIN — SIMETHICONE 80 MILLIGRAM(S): 125 TABLET, CHEWABLE ORAL at 01:36

## 2024-08-06 RX ADMIN — NIFEDIPINE 30 MILLIGRAM(S): 20 CAPSULE, LIQUID FILLED ORAL at 05:47

## 2024-08-06 RX ADMIN — Medication 975 MILLIGRAM(S): at 11:34

## 2024-08-06 RX ADMIN — Medication 400 MILLIGRAM(S): at 14:21

## 2024-08-06 NOTE — DISCHARGE NOTE OB - MEDICATION SUMMARY - MEDICATIONS TO TAKE
I will START or STAY ON the medications listed below when I get home from the hospital:    acetaminophen 325 mg oral tablet  -- 3 tab(s) by mouth every 6 hours  -- Indication: For Pain    labetalol 200 mg oral tablet  -- 2 tab(s) by mouth every 8 hours  -- Indication: For Hypertension    NIFEdipine 30 mg oral tablet, extended release  -- 1 tab(s) by mouth once a day  -- Indication: For Hypertension

## 2024-08-06 NOTE — PROGRESS NOTE ADULT - SUBJECTIVE AND OBJECTIVE BOX
Cardiology Consult    Subjective:    Interval History:  -NAEON   Pt seen and examined at bedside, NAD, denies chest pain/discomfort/pressure. ROS unremarkable     Telemetry:    New Results:        Review of Systems:  See above HPI for Review of Systems    OBJECTIVE  T(C): 36.8 (08-06-24 @ 14:00), Max: 37.3 (08-05-24 @ 18:00)  HR: 74 (08-06-24 @ 14:00) (74 - 87)  BP: 138/83 (08-06-24 @ 14:00) (126/77 - 148/83)  RR: 18 (08-06-24 @ 14:00) (18 - 19)  SpO2: 98% (08-06-24 @ 14:00) (97% - 98%)      PHYSICAL EXAM:    Constitutional: resting comfortably in bed; NAD  HEENT: NC/AT, PERRL, EOMI, anicteric sclera, no nasal discharge; uvula midline, no oropharyngeal erythema or exudates; MMM  Neck: supple; no thyromegaly, no JVD  Respiratory: CTA B/L; no W/R/R, no retractions  Cardiac: +S1/S2; RRR; no M/R/G; PMI non-displaced  Gastrointestinal: soft, NT/ND; no rebound or guarding; +BSx4  Extremities: WWP, no clubbing or cyanosis; no peripheral edema  Musculoskeletal: NROM x4; no joint swelling, tenderness or erythema  Vascular: 2+ radial, DP/PT pulses B/L  Dermatologic: skin warm, dry and intact; no rashes, wounds, or scars  Lymphatic: no submandibular or cervical LAD  Neurologic: AAOx3; CNII-XII grossly intact; no focal deficits    LABS:                >>> <<<  RADIOLOGY & ADDITIONAL TESTS:  Reviewed .    MEDICATIONS  (STANDING):  acetaminophen     Tablet .. 975 milliGRAM(s) Oral <User Schedule>  diphtheria/tetanus/pertussis (acellular) Vaccine (Adacel) 0.5 milliLiter(s) IntraMuscular once  enoxaparin Injectable 40 milliGRAM(s) SubCutaneous every 24 hours  labetalol 400 milliGRAM(s) Oral every 8 hours  NIFEdipine XL 30 milliGRAM(s) Oral every 24 hours  oxytocin Infusion 333.333 milliUNIT(s)/Min (1000 mL/Hr) IV Continuous <Continuous>  prenatal multivitamin 1 Tablet(s) Oral daily  sodium chloride 0.9%. 1000 milliLiter(s) (75 mL/Hr) IV Continuous <Continuous>  zinc oxide 20% Ointment 1 Application(s) Topical two times a day    MEDICATIONS  (PRN):  diphenhydrAMINE 25 milliGRAM(s) Oral every 6 hours PRN Pruritus  lanolin Ointment 1 Application(s) Topical every 6 hours PRN Sore Nipples  magnesium hydroxide Suspension 30 milliLiter(s) Oral two times a day PRN Constipation  oxyCODONE    IR 5 milliGRAM(s) Oral once PRN Moderate to Severe Pain (4-10)  simethicone 80 milliGRAM(s) Chew every 4 hours PRN Gas

## 2024-08-06 NOTE — PROGRESS NOTE ADULT - ATTENDING COMMENTS
Patient is a 35 yo  yo F with PMHx of SVT, HTN, Pre-eclampsia, presenting for primary  section for failed IOL for chronic HTN c/b postop ileus, now resolved)and superimposed preeclampsia with severe feature (BP), for which cardiology was consulted    Review of Studies:   -TTE 05/10/2024: Left ventricular cavity is normal in size. Left ventricular wall thickness is normal. Left ventricular systolic function is normal with an ejection fraction of 63 % by Botello's method of disks. There are no regional wall motion abnormalities seen. Normal left ventricular diastolic function, with normal filling pressure. Normal right ventricular cavity size, with normal wall thickness, and normal systolic function. Mildly dilated left atrium. No significant valvular disease.    Home Medications: Labetalol 200mg BID    Primary Cardiologist: Eliza Shepherd MD    # Pre-Eclampsia with severe Features in patient with Chronic HTN  # Hx SVT  - Patient has Hx of Pre-Eclampsia and chronic HTN  - Echo performed in 2024 revealed normal BIV function without significant valvular heart disease.   - She presented for primary  section for failed IOL for chronic HTN with post operative course notable for ileus and superimposed preeclampsia with severe feature, with SBP as high as 166/91  - Labs reviewed showing normal Platelets, LFTS, normal PC ratio of 0.1  - Since uptitration, BPs have ranged from 120's-130's range with highest registered SBP of 138/83 today  - Would cont with Labetalol 400mg TID, with strict holding parameters SBP <100 and HR <60.   - Would continue with nifedipine XL 30mg daily.   - If despite aforementioned regimen, SBP goal <140/90 not achieved, would increase nifedipine XL to 30mg po BID  - Patient should be Rx Home BP cuff monitoring for BID BP measurements  - Cardiology will cont to follow with you, please call with any questions.  - If anticipated DC today, please arrange outpatient follow up with Dr Eliza Shepherd within 2 weeks of DC   - Please call cardiology with any questions

## 2024-08-06 NOTE — PROGRESS NOTE ADULT - ASSESSMENT
A/P: 35yo  s/p primary CS and myomectomy at 37w1d for failed IOL for chronic HTN/ICP (EBL 1000) complicated by postop ileus (resolved) and superimposed preeclampsia with severe features POD#7, stable  -  Pain: PO motrin q6hrs, tylenol q8hrs, oxycodone for severe pain PRN  - superimposed preeclampsia with severe features (BP): s/p IV magnesium x24hrs, now on labetalo l400 TID and nifedipine 30qd, normotensive to mild range overnight, last push of IV antihypertensive  0900; denies toxic symptoms this AM continue vitals q4h  -  Post-operatively labs: post-op Hgb 9.7, hemodynamically stable, no symptoms of acute blood loss anemia   -  GI: postop ileus now resolved with conservative management, tolerating regular diet, passing gas  -  : s/p cancino , urinating without difficulty  -  DVT prophylaxis: encouraged increased ambulation, SCDs, SQL  -  Dispo: once stabilized

## 2024-08-06 NOTE — DISCHARGE NOTE OB - PLAN OF CARE
Follow up with your OB in one week for a Bloodpressure check.  Purchase electronic blood pressure cuff at your local pharmacy. Check blood pressure 3x a day. If bp >160/110, you develop a headache not relieved by tylenol, visual disturbances, or right upper abdominal pain, call your doctor or the hospital, or go to your nearest emergency room. Regular diet, normal activity, nothing in the vagina for 6 weeks--no sex, tampons, tub baths, or swimming pools. Please continue labetalol 400mg every 8 hours and nifedipine 30mg every 24 hours. Take Motrin 600mg every 6 hours and/or tylenol 650mg every 6 hours as needed for pain. Call your OBGYN to schedule a follow up appointment in 1-2weeks. Keep incision site clean and dry. Call your OBGYN if you experiences severe abdominal pain not improved by oral pain medications, heavy bright red vaginal bleeding saturating more than 1 pad per hour, redness/warmth at incision site, drainage from incision site, or fever greater than 100.4F.

## 2024-08-06 NOTE — DISCHARGE NOTE OB - CARE PROVIDER_API CALL
Tona Finley.  Obstetrics and Gynecology  [address] [address]  Phone: [phone]  Fax: [phone]  Established Patient  Follow Up Time:

## 2024-08-06 NOTE — PROGRESS NOTE ADULT - ASSESSMENT
Assessment: 34  y/o F with PMHx of SVT, HTN, pre-eclampsia, presenting for IOL, now s/p  POD6, however now course c/b worsening HTN while on labetalol 300mg TID, cardiology consulted for management of HTN.    Review of Studies:  CORONARY:             -STRESS:             -CCTA:             -LHC:             -RHC:  PUMP/VALVE:             -TTE: LVEF 63% with normal thickness, mild LA dilation  EP:            -EKG: NSR  OTHER:    Home Medications: Labetalol 200mg BID    Primary Cardiologist: Eliza Shepherd MD    Plan:   #Pre-eclampsia with severe features (headache, SBP >160)  #Hx SVT  -C/w labetalol to 400mg TID, goal SBP <140, hold for SBP <100 and HR <60.   -C/w nifedipine XL 30mg daily  Please have patient scheduled for follow up with Dr. Eliza Shepherd (Phone Number [phone]) for further management of HTN    Kendrick Wise PGY4 CVD Fellow  Cardiology Consult Pager: [phone]  CCU Pager: [phone]  Heart Failure Pager: [phone]

## 2024-08-06 NOTE — DISCHARGE NOTE OB - PATIENT PORTAL LINK FT
You can access the FollowMyHealth Patient Portal offered by [facility] by registering at the following website: [email or web address]. By joining [employer]’s FollowMyHealth portal, you will also be able to view your health information using other applications (apps) compatible with our system.

## 2024-08-06 NOTE — DISCHARGE NOTE OB - CARE PLAN
Principal Discharge DX:	Postpartum state  Assessment and plan of treatment:	Take Motrin 600mg every 6 hours and/or tylenol 650mg every 6 hours as needed for pain. Call your OBGYN to schedule a follow up appointment in 1-2weeks. Keep incision site clean and dry. Call your OBGYN if you experiences severe abdominal pain not improved by oral pain medications, heavy bright red vaginal bleeding saturating more than 1 pad per hour, redness/warmth at incision site, drainage from incision site, or fever greater than 100.4F.  Secondary Diagnosis:	Chronic hypertension with superimposed preeclampsia  Assessment and plan of treatment:	Follow up with your OB in one week for a Bloodpressure check.  Purchase electronic blood pressure cuff at your local pharmacy. Check blood pressure 3x a day. If bp >160/110, you develop a headache not relieved by tylenol, visual disturbances, or right upper abdominal pain, call your doctor or the hospital, or go to your nearest emergency room. Regular diet, normal activity, nothing in the vagina for 6 weeks--no sex, tampons, tub baths, or swimming pools. Please continue labetalol 400mg every 8 hours and nifedipine 30mg every 24 hours.   1

## 2024-08-06 NOTE — DISCHARGE NOTE OB - HOSPITAL COURSE
Patient underwent an uncomplicated primary LTCS for and myomectomy at 37w1d for failed IOL. Patient’s postoperative course was complicated by ileus which was managed conservatively. She was also diagnosed w/ superimposed PEC w/ SF and was treated w/ 24hr IV magnesium. BP are well controlled on labetalol 400mg TID and nifedipine 30mg daily. Upon discharge the patient is ambulating without assistance, voiding spontaneously, tolerating oral intake. Pain is well controlled with oral medication and vital signs are stable.

## 2024-08-07 ENCOUNTER — NON-APPOINTMENT (OUTPATIENT)
Age: 34
End: 2024-08-07

## 2024-08-09 ENCOUNTER — NON-APPOINTMENT (OUTPATIENT)
Age: 34
End: 2024-08-09

## 2024-08-09 ENCOUNTER — EMERGENCY (EMERGENCY)
Facility: HOSPITAL | Age: 34
LOS: 1 days | Discharge: HOME | End: 2024-08-09
Attending: EMERGENCY MEDICINE | Admitting: EMERGENCY MEDICINE
Payer: COMMERCIAL

## 2024-08-09 VITALS
HEART RATE: 74 BPM | HEIGHT: 66 IN | SYSTOLIC BLOOD PRESSURE: 108 MMHG | OXYGEN SATURATION: 96 % | RESPIRATION RATE: 19 BRPM | WEIGHT: 179.9 LBS | TEMPERATURE: 98 F | DIASTOLIC BLOOD PRESSURE: 61 MMHG

## 2024-08-09 DIAGNOSIS — Z91.018 ALLERGY TO OTHER FOODS: ICD-10-CM

## 2024-08-09 DIAGNOSIS — Z98.891 HISTORY OF UTERINE SCAR FROM PREVIOUS SURGERY: ICD-10-CM

## 2024-08-09 DIAGNOSIS — R18.8 OTHER ASCITES: ICD-10-CM

## 2024-08-09 DIAGNOSIS — R10.13 EPIGASTRIC PAIN: ICD-10-CM

## 2024-08-09 PROBLEM — Z87.59 PERSONAL HISTORY OF OTHER COMPLICATIONS OF PREGNANCY, CHILDBIRTH AND THE PUERPERIUM: Chronic | Status: ACTIVE | Noted: 2024-07-28

## 2024-08-09 PROBLEM — I47.10 SUPRAVENTRICULAR TACHYCARDIA, UNSPECIFIED: Chronic | Status: ACTIVE | Noted: 2024-07-28

## 2024-08-09 PROCEDURE — 99285 EMERGENCY DEPT VISIT HI MDM: CPT

## 2024-08-09 PROCEDURE — 99053 MED SERV 10PM-8AM 24 HR FAC: CPT

## 2024-08-09 NOTE — ED ADULT NURSE NOTE - BREATHING, MLM
Spontaneous, unlabored and symmetrical
Enoxaparin/Lovenox is used to treat and prevent blood clots. Use a different body area each time you give yourself a shot. Keep track of where you give each shot to make sure you rotate body areas. Use a new needle and syringe each time you inject your medicine. Never skip a dose of Enoxaparin/Lovenox. You must use this medicine on a fixed schedule.  NEVER TAKE A DOUBLE DOSE. Call your doctor or pharmacist if you miss a dose. Take Enoxaparin/Lovenox at the same time each morning and/or evening.

## 2024-08-09 NOTE — ED ADULT TRIAGE NOTE - CHIEF COMPLAINT QUOTE
Patient presents to ED with epigastric pain x 3 days, worse tonight. Pt had a  on , denies vaginal bleeding, denies nausea/vomiting. Denies fevers/chills/cough. Denies chest pain/sob, no dizziness/headaches.

## 2024-08-10 VITALS
OXYGEN SATURATION: 98 % | HEART RATE: 68 BPM | DIASTOLIC BLOOD PRESSURE: 75 MMHG | RESPIRATION RATE: 18 BRPM | SYSTOLIC BLOOD PRESSURE: 118 MMHG | TEMPERATURE: 98 F

## 2024-08-10 DIAGNOSIS — D25.2 SUBSEROSAL LEIOMYOMA OF UTERUS: ICD-10-CM

## 2024-08-10 DIAGNOSIS — Z98.891 HISTORY OF UTERINE SCAR FROM PREVIOUS SURGERY: Chronic | ICD-10-CM

## 2024-08-10 DIAGNOSIS — O61.9 FAILED INDUCTION OF LABOR, UNSPECIFIED: ICD-10-CM

## 2024-08-10 DIAGNOSIS — Z28.09 IMMUNIZATION NOT CARRIED OUT BECAUSE OF OTHER CONTRAINDICATION: ICD-10-CM

## 2024-08-10 DIAGNOSIS — O26.643 INTRAHEPATIC CHOLESTASIS OF PREGNANCY, THIRD TRIMESTER: ICD-10-CM

## 2024-08-10 DIAGNOSIS — Z3A.37 37 WEEKS GESTATION OF PREGNANCY: ICD-10-CM

## 2024-08-10 DIAGNOSIS — D25.1 INTRAMURAL LEIOMYOMA OF UTERUS: ICD-10-CM

## 2024-08-10 DIAGNOSIS — I47.10 SUPRAVENTRICULAR TACHYCARDIA, UNSPECIFIED: ICD-10-CM

## 2024-08-10 DIAGNOSIS — K56.7 ILEUS, UNSPECIFIED: ICD-10-CM

## 2024-08-10 DIAGNOSIS — Z79.82 LONG TERM (CURRENT) USE OF ASPIRIN: ICD-10-CM

## 2024-08-10 LAB
ALBUMIN SERPL ELPH-MCNC: 3.4 G/DL — SIGNIFICANT CHANGE UP (ref 3.3–5)
ALP SERPL-CCNC: 79 U/L — SIGNIFICANT CHANGE UP (ref 40–120)
ALT FLD-CCNC: 7 U/L — LOW (ref 10–45)
ANION GAP SERPL CALC-SCNC: 9 MMOL/L — SIGNIFICANT CHANGE UP (ref 5–17)
APPEARANCE UR: ABNORMAL
APTT BLD: 30.6 SEC — SIGNIFICANT CHANGE UP (ref 24.5–35.6)
AST SERPL-CCNC: 14 U/L — SIGNIFICANT CHANGE UP (ref 10–40)
BACTERIA # UR AUTO: NEGATIVE /HPF — SIGNIFICANT CHANGE UP
BASOPHILS # BLD AUTO: 0.03 K/UL — SIGNIFICANT CHANGE UP (ref 0–0.2)
BASOPHILS NFR BLD AUTO: 0.3 % — SIGNIFICANT CHANGE UP (ref 0–2)
BILIRUB SERPL-MCNC: <0.2 MG/DL — SIGNIFICANT CHANGE UP (ref 0.2–1.2)
BILIRUB UR-MCNC: NEGATIVE — SIGNIFICANT CHANGE UP
BLD GP AB SCN SERPL QL: NEGATIVE — SIGNIFICANT CHANGE UP
BUN SERPL-MCNC: 8 MG/DL — SIGNIFICANT CHANGE UP (ref 7–23)
CALCIUM SERPL-MCNC: 9.4 MG/DL — SIGNIFICANT CHANGE UP (ref 8.4–10.5)
CAST: 1 /LPF — SIGNIFICANT CHANGE UP (ref 0–4)
CHLORIDE SERPL-SCNC: 102 MMOL/L — SIGNIFICANT CHANGE UP (ref 96–108)
CO2 SERPL-SCNC: 25 MMOL/L — SIGNIFICANT CHANGE UP (ref 22–31)
COLOR SPEC: YELLOW — SIGNIFICANT CHANGE UP
CREAT SERPL-MCNC: 0.68 MG/DL — SIGNIFICANT CHANGE UP (ref 0.5–1.3)
DIFF PNL FLD: ABNORMAL
EGFR: 116 ML/MIN/1.73M2 — SIGNIFICANT CHANGE UP
EOSINOPHIL # BLD AUTO: 0.22 K/UL — SIGNIFICANT CHANGE UP (ref 0–0.5)
EOSINOPHIL NFR BLD AUTO: 2.3 % — SIGNIFICANT CHANGE UP (ref 0–6)
GLUCOSE SERPL-MCNC: 102 MG/DL — HIGH (ref 70–99)
GLUCOSE UR QL: NEGATIVE MG/DL — SIGNIFICANT CHANGE UP
HCT VFR BLD CALC: 33 % — LOW (ref 34.5–45)
HGB BLD-MCNC: 10.5 G/DL — LOW (ref 11.5–15.5)
IMM GRANULOCYTES NFR BLD AUTO: 0.8 % — SIGNIFICANT CHANGE UP (ref 0–0.9)
INR BLD: 1.03 — SIGNIFICANT CHANGE UP (ref 0.85–1.18)
KETONES UR-MCNC: NEGATIVE MG/DL — SIGNIFICANT CHANGE UP
LACTATE SERPL-SCNC: 0.5 MMOL/L — SIGNIFICANT CHANGE UP (ref 0.5–2)
LDH SERPL L TO P-CCNC: 237 U/L — SIGNIFICANT CHANGE UP (ref 50–242)
LEUKOCYTE ESTERASE UR-ACNC: ABNORMAL
LIDOCAIN IGE QN: 12 U/L — SIGNIFICANT CHANGE UP (ref 7–60)
LYMPHOCYTES # BLD AUTO: 1.56 K/UL — SIGNIFICANT CHANGE UP (ref 1–3.3)
LYMPHOCYTES # BLD AUTO: 16.3 % — SIGNIFICANT CHANGE UP (ref 13–44)
MAGNESIUM SERPL-MCNC: 1.6 MG/DL — SIGNIFICANT CHANGE UP (ref 1.6–2.6)
MCHC RBC-ENTMCNC: 28 PG — SIGNIFICANT CHANGE UP (ref 27–34)
MCHC RBC-ENTMCNC: 31.8 GM/DL — LOW (ref 32–36)
MCV RBC AUTO: 88 FL — SIGNIFICANT CHANGE UP (ref 80–100)
MONOCYTES # BLD AUTO: 0.7 K/UL — SIGNIFICANT CHANGE UP (ref 0–0.9)
MONOCYTES NFR BLD AUTO: 7.3 % — SIGNIFICANT CHANGE UP (ref 2–14)
NEUTROPHILS # BLD AUTO: 6.97 K/UL — SIGNIFICANT CHANGE UP (ref 1.8–7.4)
NEUTROPHILS NFR BLD AUTO: 73 % — SIGNIFICANT CHANGE UP (ref 43–77)
NITRITE UR-MCNC: NEGATIVE — SIGNIFICANT CHANGE UP
NRBC # BLD: 0 /100 WBCS — SIGNIFICANT CHANGE UP (ref 0–0)
PH UR: 6.5 — SIGNIFICANT CHANGE UP (ref 5–8)
PLATELET # BLD AUTO: 503 K/UL — HIGH (ref 150–400)
POTASSIUM SERPL-MCNC: 4.1 MMOL/L — SIGNIFICANT CHANGE UP (ref 3.5–5.3)
POTASSIUM SERPL-SCNC: 4.1 MMOL/L — SIGNIFICANT CHANGE UP (ref 3.5–5.3)
PROT SERPL-MCNC: 6.2 G/DL — SIGNIFICANT CHANGE UP (ref 6–8.3)
PROT UR-MCNC: 30 MG/DL
PROTHROM AB SERPL-ACNC: 11.7 SEC — SIGNIFICANT CHANGE UP (ref 9.5–13)
RBC # BLD: 3.75 M/UL — LOW (ref 3.8–5.2)
RBC # FLD: 14.9 % — HIGH (ref 10.3–14.5)
RBC CASTS # UR COMP ASSIST: 36 /HPF — HIGH (ref 0–4)
RH IG SCN BLD-IMP: POSITIVE — SIGNIFICANT CHANGE UP
SODIUM SERPL-SCNC: 136 MMOL/L — SIGNIFICANT CHANGE UP (ref 135–145)
SP GR SPEC: 1.01 — SIGNIFICANT CHANGE UP (ref 1–1.03)
SQUAMOUS # UR AUTO: 1 /HPF — SIGNIFICANT CHANGE UP (ref 0–5)
URATE SERPL-MCNC: 5.6 MG/DL — SIGNIFICANT CHANGE UP (ref 2.5–7)
UROBILINOGEN FLD QL: 0.2 MG/DL — SIGNIFICANT CHANGE UP (ref 0.2–1)
WBC # BLD: 9.56 K/UL — SIGNIFICANT CHANGE UP (ref 3.8–10.5)
WBC # FLD AUTO: 9.56 K/UL — SIGNIFICANT CHANGE UP (ref 3.8–10.5)
WBC UR QL: 120 /HPF — HIGH (ref 0–5)

## 2024-08-10 PROCEDURE — 76705 ECHO EXAM OF ABDOMEN: CPT

## 2024-08-10 PROCEDURE — 85025 COMPLETE CBC W/AUTO DIFF WBC: CPT

## 2024-08-10 PROCEDURE — 36415 COLL VENOUS BLD VENIPUNCTURE: CPT

## 2024-08-10 PROCEDURE — 85730 THROMBOPLASTIN TIME PARTIAL: CPT

## 2024-08-10 PROCEDURE — 74177 CT ABD & PELVIS W/CONTRAST: CPT | Mod: MC

## 2024-08-10 PROCEDURE — 99284 EMERGENCY DEPT VISIT MOD MDM: CPT | Mod: 25

## 2024-08-10 PROCEDURE — 85610 PROTHROMBIN TIME: CPT

## 2024-08-10 PROCEDURE — 83690 ASSAY OF LIPASE: CPT

## 2024-08-10 PROCEDURE — 76705 ECHO EXAM OF ABDOMEN: CPT | Mod: 26

## 2024-08-10 PROCEDURE — 86850 RBC ANTIBODY SCREEN: CPT

## 2024-08-10 PROCEDURE — 83735 ASSAY OF MAGNESIUM: CPT

## 2024-08-10 PROCEDURE — 86901 BLOOD TYPING SEROLOGIC RH(D): CPT

## 2024-08-10 PROCEDURE — 81001 URINALYSIS AUTO W/SCOPE: CPT

## 2024-08-10 PROCEDURE — 84550 ASSAY OF BLOOD/URIC ACID: CPT

## 2024-08-10 PROCEDURE — 80053 COMPREHEN METABOLIC PANEL: CPT

## 2024-08-10 PROCEDURE — 74177 CT ABD & PELVIS W/CONTRAST: CPT | Mod: 26,MC

## 2024-08-10 PROCEDURE — 87086 URINE CULTURE/COLONY COUNT: CPT

## 2024-08-10 PROCEDURE — 83605 ASSAY OF LACTIC ACID: CPT

## 2024-08-10 PROCEDURE — 83615 LACTATE (LD) (LDH) ENZYME: CPT

## 2024-08-10 PROCEDURE — 86900 BLOOD TYPING SEROLOGIC ABO: CPT

## 2024-08-10 RX ORDER — FAMOTIDINE 40 MG/1
1 TABLET, FILM COATED ORAL
Qty: 10 | Refills: 0
Start: 2024-08-10 | End: 2024-08-19

## 2024-08-10 RX ORDER — MAGNESIUM SULFATE 500 MG/ML
1 VIAL (ML) INJECTION ONCE
Refills: 0 | Status: COMPLETED | OUTPATIENT
Start: 2024-08-10 | End: 2024-08-10

## 2024-08-10 RX ORDER — FAMOTIDINE 40 MG/1
20 TABLET, FILM COATED ORAL ONCE
Refills: 0 | Status: COMPLETED | OUTPATIENT
Start: 2024-08-10 | End: 2024-08-10

## 2024-08-10 RX ORDER — SUCRALFATE 1 G/1
1 TABLET ORAL ONCE
Refills: 0 | Status: COMPLETED | OUTPATIENT
Start: 2024-08-10 | End: 2024-08-10

## 2024-08-10 RX ORDER — ACETAMINOPHEN 500 MG
1000 TABLET ORAL ONCE
Refills: 0 | Status: COMPLETED | OUTPATIENT
Start: 2024-08-10 | End: 2024-08-10

## 2024-08-10 RX ORDER — SUCRALFATE 1 G/1
10 TABLET ORAL
Qty: 150 | Refills: 0
Start: 2024-08-10 | End: 2024-08-14

## 2024-08-10 RX ORDER — KETOROLAC TROMETHAMINE 10 MG
15 TABLET ORAL ONCE
Refills: 0 | Status: DISCONTINUED | OUTPATIENT
Start: 2024-08-10 | End: 2024-08-10

## 2024-08-10 RX ADMIN — Medication 1000 MILLIGRAM(S): at 02:07

## 2024-08-10 RX ADMIN — Medication 400 MILLIGRAM(S): at 00:37

## 2024-08-10 RX ADMIN — Medication 15 MILLIGRAM(S): at 05:12

## 2024-08-10 RX ADMIN — Medication 1 GRAM(S): at 03:12

## 2024-08-10 RX ADMIN — Medication 1000 MILLIGRAM(S): at 02:00

## 2024-08-10 RX ADMIN — Medication 15 MILLIGRAM(S): at 03:31

## 2024-08-10 RX ADMIN — Medication 100 GRAM(S): at 01:51

## 2024-08-10 RX ADMIN — SUCRALFATE 1 GRAM(S): 1 TABLET ORAL at 00:38

## 2024-08-10 RX ADMIN — FAMOTIDINE 20 MILLIGRAM(S): 40 TABLET, FILM COATED ORAL at 00:38

## 2024-08-10 NOTE — ED PROVIDER NOTE - PROGRESS NOTE DETAILS
spoke with US tech - concern for loculated fluid collection in abd. will get CT CT - concerning for uterine dehiscence, loculated fluid with peripheral enhancement, peritonitis CT - concerning for uterine dehiscence, loculated fluid with peripheral enhancement, peritonitis, pending GYN recs per gyn - pt does not wish to be admitted at this time. pt can be discharged, given strict return precautions. pt will see Dr. Finley  Monday. Dr. Finley will speak with Dr. Rubio to see if requires IR drainage of fluid collections.  I have discussed the discharge plan with the patient. The patient agrees with the plan, as discussed.  The patient understands Emergency Department diagnosis is a preliminary diagnosis often based on limited information and that the patient must adhere to the follow-up plan as discussed.  The patient understands that if the symptoms worsen or if prescribed medications do not have the desired/planned effect that the patient may return to the Emergency Department at any time for further evaluation and treatment.

## 2024-08-10 NOTE — ED PROVIDER NOTE - PATIENT PORTAL LINK FT
You can access the FollowMyHealth Patient Portal offered by [facility] by registering at the following website: [email or web address]. By joining [employer]’s FollowMyHealth portal, you will also be able to view your health information using other applications (apps) compatible with our system.

## 2024-08-10 NOTE — CONSULT NOTE ADULT - SUBJECTIVE AND OBJECTIVE BOX
35yo  POD11 from primary CS for failed IOL c/b ileus (managed conservatively) and superimposed preelcampsia with severe features (s/p IV magnesium x24h) presents to ED with epigastric pain for r/o recurrent superimposed PEC w/ SF. Patient states she has been having episodic throbbing epigastric pain that comes/goes about 20x per day. It is not related to eating, she has not identified any triggers. She has tried pepcid and motrin without relief. During an episode the pain is 10/10. She denies nausea, vomiting, diarrhea, constipation. She is passing flatus. She has not other symptoms- denies HA, scotoma, RUQ pain. She reports lochia is light spotting. She is breast and formula feeding. Reports postop pain is much improved. She reports BP at home 120-130s/80s on labetalol 400 TID and nifedipine 30qd.    OBHx: : pCS with myomectomy 2024 for failed IOL for cHTN c/b postop ileus and superimposed PEC with SF (BP) s/p Mg x24h d/c on labetalol 400 TID and nifed 30 qd.  GynHX: 3 fibroids 3-4 cm (s/p myomectomy at time of C/S). Denies ovarian cysts, abnormal pap smear, STI/herpes.   MedHX: denies  Surghx: C/S , l/s inguinal hernia repair ; hysteroscopic uterine polypectomy , ,   Medications: labetalol 400 TID, nifed 30 qd  Allergies: NKDA    PHYSICAL EXAM:   Vital Signs Last 24 Hrs  T(C): 36.9 (09 Aug 2024 23:47), Max: 36.9 (09 Aug 2024 23:47)  T(F): 98.5 (09 Aug 2024 23:47), Max: 98.5 (09 Aug 2024 23:47)  HR: 74 (09 Aug 2024 23:47) (74 - 74)  BP: 108/61 (09 Aug 2024 23:47) (108/61 - 108/61)  BP(mean): --  RR: 19 (09 Aug 2024 23:47) (19 - 19)  SpO2: 96% (09 Aug 2024 23:47) (96% - 96%)    Parameters below as of 09 Aug 2024 23:47  Patient On (Oxygen Delivery Method): room air        **************************  Constitutional: No acute distress, answering questions appropriately  Respiratory: no increased WOB, CTAB  CV: RRR  Abdominal: soft, non tender, nondistended, no epigastric tenderness, no rebound or guarding. Incision C/D/I with steri strips in situ  Bimanual exam: deferred  Speculum exam: deferred  Extremities: no calf tenderness     LABS:                  RADIOLOGY & ADDITIONAL STUDIES: 33yo  POD11 from primary CS for failed IOL c/b ileus (managed conservatively) and superimposed preeclampsia with severe features (s/p IV magnesium x24h) presents to ED with epigastric pain for r/o recurrent superimposed PEC w/ SF. Patient states she has been having episodic throbbing epigastric pain that comes/goes about 20x per day. It is not related to eating, she has not identified any triggers. She has tried pepcid and motrin without relief. During an episode the pain is 10/10. She denies nausea, vomiting, diarrhea, constipation. She is passing flatus. She has not other symptoms- denies HA, scotoma, RUQ pain. She reports lochia is light spotting. She is breast and formula feeding. Reports postop pain is much improved. She reports BP at home 120-130s/80s on labetalol 400 TID and nifedipine 30qd.    OBHx: : pCS with myomectomy 2024 for failed IOL for cHTN c/b postop ileus and superimposed PEC with SF (BP) s/p Mg x24h d/c on labetalol 400 TID and nifed 30 qd.  GynHX: 3 fibroids 3-4 cm (s/p myomectomy at time of C/S). Denies ovarian cysts, abnormal pap smear, STI/herpes.   MedHX: denies  Surghx: C/S , l/s inguinal hernia repair ; hysteroscopic uterine polypectomy , ,   Medications: labetalol 400 TID, nifed 30 qd  Allergies: NKDA    PHYSICAL EXAM:   Vital Signs Last 24 Hrs  T(C): 36.9 (09 Aug 2024 23:47), Max: 36.9 (09 Aug 2024 23:47)  T(F): 98.5 (09 Aug 2024 23:47), Max: 98.5 (09 Aug 2024 23:47)  HR: 74 (09 Aug 2024 23:47) (74 - 74)  BP: 108/61 (09 Aug 2024 23:47) (108/61 - 108/61)  BP(mean): --  RR: 19 (09 Aug 2024 23:47) (19 - 19)  SpO2: 96% (09 Aug 2024 23:47) (96% - 96%)    Parameters below as of 09 Aug 2024 23:47  Patient On (Oxygen Delivery Method): room air        **************************  Constitutional: No acute distress, answering questions appropriately  Respiratory: no increased WOB, CTAB  CV: RRR  Abdominal: soft, non tender, nondistended, no epigastric tenderness, no rebound or guarding. Incision C/D/I with steri strips in situ  Bimanual exam: deferred  Speculum exam: deferred  Extremities: no calf tenderness       LABS:  cret                        10.5   9.56  )-----------( 503      ( 10 Aug 2024 00:02 )             33.0     08-10    136  |  102  |  8   ----------------------------<  102<H>  4.1   |  25  |  0.68    Ca    9.4      10 Aug 2024 00:02  Mg     1.6     08-10    TPro  6.2  /  Alb  3.4  /  TBili  <0.2  /  DBili  x   /  AST  14  /  ALT  7<L>  /  AlkPhos  79  08-10                  RADIOLOGY & ADDITIONAL STUDIES: 33yo  POD11 from primary CS for failed IOL c/b ileus (managed conservatively) and superimposed preeclampsia with severe features (s/p IV magnesium x24h) presents to ED with epigastric pain for r/o recurrent superimposed PEC w/ SF. Patient states she has been having episodic throbbing epigastric pain that comes/goes about 20x per day. It is not related to eating, she has not identified any triggers. She has tried pepcid and motrin without relief. During an episode the pain is 10/10. She denies nausea, vomiting, diarrhea, constipation. She is passing flatus. She has not other symptoms- denies HA, scotoma, RUQ pain. She reports lochia is light spotting. She is breast and formula feeding. Reports postop pain is much improved. She reports BP at home 120-130s/80s on labetalol 400 TID and nifedipine 30qd.    OBHx: : pCS with myomectomy 2024 for failed IOL for cHTN c/b postop ileus and superimposed PEC with SF (BP) s/p Mg x24h d/c on labetalol 400 TID and nifed 30 qd.  GynHX: 3 fibroids 3-4 cm (s/p myomectomy at time of C/S). Denies ovarian cysts, abnormal pap smear, STI/herpes.   MedHX: denies  Surghx: C/S , l/s inguinal hernia repair ; hysteroscopic uterine polypectomy , ,   Medications: labetalol 400 TID, nifed 30 qd  Allergies: NKDA    PHYSICAL EXAM:   Vital Signs Last 24 Hrs  T(C): 36.9 (09 Aug 2024 23:47), Max: 36.9 (09 Aug 2024 23:47)  T(F): 98.5 (09 Aug 2024 23:47), Max: 98.5 (09 Aug 2024 23:47)  HR: 74 (09 Aug 2024 23:47) (74 - 74)  BP: 108/61 (09 Aug 2024 23:47) (108/61 - 108/61)  BP(mean): --  RR: 19 (09 Aug 2024 23:47) (19 - 19)  SpO2: 96% (09 Aug 2024 23:47) (96% - 96%)    Parameters below as of 09 Aug 2024 23:47  Patient On (Oxygen Delivery Method): room air        **************************  Constitutional: No acute distress, answering questions appropriately  Respiratory: no increased WOB, CTAB  CV: RRR  Abdominal: soft, non tender, nondistended, no epigastric tenderness, no rebound or guarding. Incision C/D/I with steri strips in situ  Bimanual exam: deferred  Speculum exam: deferred  Extremities: no calf tenderness       LABS:  cret                        10.5   9.56  )-----------( 503      ( 10 Aug 2024 00:02 )             33.0     08-10    136  |  102  |  8   ----------------------------<  102<H>  4.1   |  25  |  0.68    Ca    9.4      10 Aug 2024 00:02  Mg     1.6     08-10    TPro  6.2  /  Alb  3.4  /  TBili  <0.2  /  DBili  x   /  AST  14  /  ALT  7<L>  /  AlkPhos  79  08-10                  RADIOLOGY & ADDITIONAL STUDIES:    < from: US Abdomen Limited (08.10.24 @ 01:33) >    ACC: [id number] EXAM:  US ABDOMEN LIMITED   ORDERED BY: TARYN TSAI     PROCEDURE DATE:  08/10/2024          INTERPRETATION:  CLINICAL INFORMATION: Upper abdominal pain. History of   cholestasis.    COMPARISON: Abdominal ultrasound 8/3/2024. CT abdomen/pelvis 2024.    TECHNIQUE: Sonography of the right upper quadrant.    FINDINGS:    Liver: Upper limit of normal size, 18 cm. Normal echogenicity. No focal   lesion. Main portal vein with normal hepatopetal flow.  Bile ducts: Normal caliber. Common bile duct measures 0.3 cm.  Gallbladder: Mild sludge. No cholelithiasis or gallbladder wall   thickening. Negative sonographic Serrano's sign.  Pancreas: Visualized portions are within normal limits.  Right kidney: 11.7 cm. No hydronephrosis.  Ascites: 12.3 x 4.4 x 11.2 cm complex cystic structure with internal   echoes within the midline/left lower quadrant abdomen.  IVC: Visualized portions are within normal limits.    Small right pleural effusion.      IMPRESSION:    1.  Mild gallbladder sludge. No cholelithiasis or evidence of acute   cholecystitis.  2.  No biliary dilatation.  3.  12.3 cm complex cystic structure within the midline/left lower   quadrant abdomen. This may represent complex ascites, hematoma, etc. This   is likely postoperative etiology from recent  section.        --- End of Report ---          RIGOBERTO BOLDEN MD; Resident Radiologist  This document has been electronically signed.  AGUSTINA HERNANDEZ MD; Attending Radiologist  This document has been electronically signed. Aug 10 2024  2:15AM    < end of copied text >   33yo  POD11 from primary CS for failed IOL c/b ileus (managed conservatively) and superimposed preeclampsia with severe features (s/p IV magnesium x24h) presents to ED with epigastric pain for r/o recurrent superimposed PEC w/ SF. Patient states she has been having episodic throbbing epigastric pain that comes/goes about 20x per day. It is not related to eating, she has not identified any triggers. She has tried pepcid and motrin without relief. During an episode the pain is 10/10. She denies nausea, vomiting, diarrhea, constipation. She is passing flatus. She has not other symptoms- denies HA, scotoma, RUQ pain. She reports lochia is light spotting. She is breast and formula feeding. Reports postop pain is much improved. She reports BP at home 120-130s/80s on labetalol 400 TID and nifedipine 30qd.    OBHx: : pCS with myomectomy 2024 for failed IOL for cHTN c/b postop ileus and superimposed PEC with SF (BP) s/p Mg x24h d/c on labetalol 400 TID and nifed 30 qd.  GynHX: 3 fibroids 3-4 cm (s/p myomectomy at time of C/S). Denies ovarian cysts, abnormal pap smear, STI/herpes.   MedHX: denies  Surghx: C/S , l/s inguinal hernia repair ; hysteroscopic uterine polypectomy , ,   Medications: labetalol 400 TID, nifed 30 qd  Allergies: NKDA    PHYSICAL EXAM:   Vital Signs Last 24 Hrs  T(C): 36.9 (09 Aug 2024 23:47), Max: 36.9 (09 Aug 2024 23:47)  T(F): 98.5 (09 Aug 2024 23:47), Max: 98.5 (09 Aug 2024 23:47)  HR: 74 (09 Aug 2024 23:47) (74 - 74)  BP: 108/61 (09 Aug 2024 23:47) (108/61 - 108/61)  BP(mean): --  RR: 19 (09 Aug 2024 23:47) (19 - 19)  SpO2: 96% (09 Aug 2024 23:47) (96% - 96%)    Parameters below as of 09 Aug 2024 23:47  Patient On (Oxygen Delivery Method): room air        **************************  Constitutional: No acute distress, answering questions appropriately  Respiratory: no increased WOB, CTAB  CV: RRR  Abdominal: soft, non tender, nondistended, no epigastric tenderness, no rebound or guarding. Incision C/D/I with steri strips in situ  Bimanual exam: deferred  Speculum exam: deferred  Extremities: no calf tenderness       LABS:  cret                        10.5   9.56  )-----------( 503      ( 10 Aug 2024 00:02 )             33.0     08-10    136  |  102  |  8   ----------------------------<  102<H>  4.1   |  25  |  0.68    Ca    9.4      10 Aug 2024 00:02  Mg     1.6     08-10    TPro  6.2  /  Alb  3.4  /  TBili  <0.2  /  DBili  x   /  AST  14  /  ALT  7<L>  /  AlkPhos  79  08-10                  RADIOLOGY & ADDITIONAL STUDIES:    < from: US Abdomen Limited (08.10.24 @ 01:33) >    ACC: [id number] EXAM:  US ABDOMEN LIMITED   ORDERED BY: TARYN TSAI     PROCEDURE DATE:  08/10/2024          INTERPRETATION:  CLINICAL INFORMATION: Upper abdominal pain. History of   cholestasis.    COMPARISON: Abdominal ultrasound 8/3/2024. CT abdomen/pelvis 2024.    TECHNIQUE: Sonography of the right upper quadrant.    FINDINGS:    Liver: Upper limit of normal size, 18 cm. Normal echogenicity. No focal   lesion. Main portal vein with normal hepatopetal flow.  Bile ducts: Normal caliber. Common bile duct measures 0.3 cm.  Gallbladder: Mild sludge. No cholelithiasis or gallbladder wall   thickening. Negative sonographic Serrano's sign.  Pancreas: Visualized portions are within normal limits.  Right kidney: 11.7 cm. No hydronephrosis.  Ascites: 12.3 x 4.4 x 11.2 cm complex cystic structure with internal   echoes within the midline/left lower quadrant abdomen.  IVC: Visualized portions are within normal limits.    Small right pleural effusion.      IMPRESSION:    1.  Mild gallbladder sludge. No cholelithiasis or evidence of acute   cholecystitis.  2.  No biliary dilatation.  3.  12.3 cm complex cystic structure within the midline/left lower   quadrant abdomen. This may represent complex ascites, hematoma, etc. This   is likely postoperative etiology from recent  section.        --- End of Report ---          RIGOBERTO BOLDEN MD; Resident Radiologist  This document has been electronically signed.  AGUSTINA HERNANDEZ MD; Attending Radiologist  This document has been electronically signed. Aug 10 2024  2:15AM    < end of copied text >      < from: CT Abdomen and Pelvis w/ IV Cont (08.10.24 @ 02:30) >    ACC: [id number] EXAM:  CT ABDOMEN AND PELVIS IC   ORDERED BY: TARYN TSAI     PROCEDURE DATE:  08/10/2024          INTERPRETATION:  CLINICAL INFORMATION: Abdominal pain. Status post    , fluid collection on ultrasound.    COMPARISON: Abdominal ultrasound 8/10/2024. CT abdomen/pelvis 2024.    CONTRAST/COMPLICATIONS:  IV Contrast: Isovue 370  90 cc administered   10 cc discarded  Oral Contrast: NONE  Complications: None reported at time of study completion    PROCEDURE:  CT of the Abdomen and Pelvis was performed.  Sagittal and coronal reformats were performed.    FINDINGS:  LOWER CHEST: Small right pleural effusion with underlying passive   atelectasis right lower lobe.    LIVER: Subcentimeter hypodensity too small to characterize left hepatic   lobe.  BILE DUCTS: Normal caliber.  GALLBLADDER: Within normal limits.  SPLEEN: Normal size. Few hypoattenuating lesions, largest measuring 1.4   cm inferior aspect of the spleen, possible cyst or hemangioma.  PANCREAS: Within normal limits.  ADRENALS: Within normal limits.  KIDNEYS/URETERS: Within normal limits.    BLADDER: Within normal limits.  REPRODUCTIVE ORGANS: Post gravid uterus with  scar within the   anterior lower uterine segment. Focal discontinuity is noted along the   anterior aspect of the  scar (4:74) along with subjacent fluid.   Multiple myometrial masses, likely leiomyomas. Unremarkable bilateral   adnexa.    BOWEL: No bowel obstruction. Small bowel wall thickening, likely   reactive. Appendix is not visualized. No evidence of inflammation in the   pericecal region.  PERITONEUM/RETROPERITONEUM: Since 2024, increased loculated ascites   with peripheral enhancement suggestive of peritonitis, likely   postoperative in etiology. No free air.  VESSELS: Within normal limits.  LYMPH NODES: No lymphadenopathy.  ABDOMINAL WALL: Postsurgical changes.  BONES: Within normal limits.    IMPRESSION:  1.  Postgravid uterus with  scar within the anterior lower   uterine segment. Focal discontinuity is noted along the anterior aspect   of the  scar along with subjacent fluid, suspicious for   dehiscence. There is subjacent fluid which appears contiguous with   loculated ascites with peripheral enhancement suspicious for abscess   formation and peritonitis. Largest pocket of loculated ascites anteriorly   measures approximately 13.3 cm.  2.  Small right pleural effusion with underlying passive atelectasis   right lower lobe.  3.  Ancillary findings as above.    Findings were discussed with Dr. TARYN TSAI [phone] 8/10/2024   3:52 AM by Dr. Hernandez with read back confirmation.    --- End of Report ---          RIGOBERTO BOLDEN MD; Resident Radiologist  This document has been electronically signed.  ANA HERNANDEZ MD; Attending Radiologist  This document has been electronically signed. Aug 10 2024  3:56AM    < end of copied text >   33yo  POD11 from primary CS for failed IOL c/b ileus (managed conservatively) and superimposed preeclampsia with severe features (s/p IV magnesium x24h) presents to ED with epigastric pain for r/o recurrent superimposed PEC w/ SF. Patient states she has been having episodic throbbing epigastric pain that comes/goes about 20x per day. It is not related to eating, she has not identified any triggers. She has tried pepcid and motrin without relief. During an episode the pain is 10/10. She denies nausea, vomiting, diarrhea, constipation. She is passing flatus. She has not other symptoms- denies HA, scotoma, RUQ pain. She reports lochia is light spotting. She is breast and formula feeding. Reports postop pain is much improved. She reports BP at home 120-130s/80s on labetalol 400 TID and nifedipine 30qd.    OBHx: : pCS with myomectomy 2024 for failed IOL for cHTN c/b postop ileus and superimposed PEC with SF (BP) s/p Mg x24h d/c on labetalol 400 TID and nifed 30 qd.  GynHX: 3 fibroids 3-4 cm (s/p myomectomy at time of C/S). Denies ovarian cysts, abnormal pap smear, STI/herpes.   MedHX: denies  Surghx: C/S , l/s inguinal hernia repair ; hysteroscopic uterine polypectomy , ,   Medications: labetalol 400 TID, nifed 30 qd  Allergies: NKDA    PHYSICAL EXAM:   Vital Signs Last 24 Hrs  T(C): 36.9 (09 Aug 2024 23:47), Max: 36.9 (09 Aug 2024 23:47)  T(F): 98.5 (09 Aug 2024 23:47), Max: 98.5 (09 Aug 2024 23:47)  HR: 74 (09 Aug 2024 23:47) (74 - 74)  BP: 108/61 (09 Aug 2024 23:47) (108/61 - 108/61)  BP(mean): --  RR: 19 (09 Aug 2024 23:47) (19 - 19)  SpO2: 96% (09 Aug 2024 23:47) (96% - 96%)    Parameters below as of 09 Aug 2024 23:47  Patient On (Oxygen Delivery Method): room air        **************************  Constitutional: No acute distress, answering questions appropriately  Respiratory: no increased WOB, CTAB  CV: RRR  Abdominal: soft, non tender, nondistended. Mild tenderness to palpation above umbilicus/epigastric region, no rebound or guarding. No RUQ tenderness. Incision C/D/I with steri strips in situ  Bimanual exam: deferred  Speculum exam: deferred  Extremities: no calf tenderness       LABS:  cret                        10.5   9.56  )-----------( 503      ( 10 Aug 2024 00:02 )             33.0     08-10    136  |  102  |  8   ----------------------------<  102<H>  4.1   |  25  |  0.68    Ca    9.4      10 Aug 2024 00:02  Mg     1.6     08-10    TPro  6.2  /  Alb  3.4  /  TBili  <0.2  /  DBili  x   /  AST  14  /  ALT  7<L>  /  AlkPhos  79  08-10                  RADIOLOGY & ADDITIONAL STUDIES:    < from: US Abdomen Limited (08.10.24 @ 01:33) >    ACC: [id number] EXAM:  US ABDOMEN LIMITED   ORDERED BY: TARYN TSAI     PROCEDURE DATE:  08/10/2024          INTERPRETATION:  CLINICAL INFORMATION: Upper abdominal pain. History of   cholestasis.    COMPARISON: Abdominal ultrasound 8/3/2024. CT abdomen/pelvis 2024.    TECHNIQUE: Sonography of the right upper quadrant.    FINDINGS:    Liver: Upper limit of normal size, 18 cm. Normal echogenicity. No focal   lesion. Main portal vein with normal hepatopetal flow.  Bile ducts: Normal caliber. Common bile duct measures 0.3 cm.  Gallbladder: Mild sludge. No cholelithiasis or gallbladder wall   thickening. Negative sonographic Serrano's sign.  Pancreas: Visualized portions are within normal limits.  Right kidney: 11.7 cm. No hydronephrosis.  Ascites: 12.3 x 4.4 x 11.2 cm complex cystic structure with internal   echoes within the midline/left lower quadrant abdomen.  IVC: Visualized portions are within normal limits.    Small right pleural effusion.      IMPRESSION:    1.  Mild gallbladder sludge. No cholelithiasis or evidence of acute   cholecystitis.  2.  No biliary dilatation.  3.  12.3 cm complex cystic structure within the midline/left lower   quadrant abdomen. This may represent complex ascites, hematoma, etc. This   is likely postoperative etiology from recent  section.        --- End of Report ---          RIGOBERTO BOLDEN MD; Resident Radiologist  This document has been electronically signed.  AGUSTINA HERNANDEZ MD; Attending Radiologist  This document has been electronically signed. Aug 10 2024  2:15AM    < end of copied text >      < from: CT Abdomen and Pelvis w/ IV Cont (08.10.24 @ 02:30) >    ACC: [id number] EXAM:  CT ABDOMEN AND PELVIS IC   ORDERED BY: TARYN TSAI     PROCEDURE DATE:  08/10/2024          INTERPRETATION:  CLINICAL INFORMATION: Abdominal pain. Status post    , fluid collection on ultrasound.    COMPARISON: Abdominal ultrasound 8/10/2024. CT abdomen/pelvis 2024.    CONTRAST/COMPLICATIONS:  IV Contrast: Isovue 370  90 cc administered   10 cc discarded  Oral Contrast: NONE  Complications: None reported at time of study completion    PROCEDURE:  CT of the Abdomen and Pelvis was performed.  Sagittal and coronal reformats were performed.    FINDINGS:  LOWER CHEST: Small right pleural effusion with underlying passive   atelectasis right lower lobe.    LIVER: Subcentimeter hypodensity too small to characterize left hepatic   lobe.  BILE DUCTS: Normal caliber.  GALLBLADDER: Within normal limits.  SPLEEN: Normal size. Few hypoattenuating lesions, largest measuring 1.4   cm inferior aspect of the spleen, possible cyst or hemangioma.  PANCREAS: Within normal limits.  ADRENALS: Within normal limits.  KIDNEYS/URETERS: Within normal limits.    BLADDER: Within normal limits.  REPRODUCTIVE ORGANS: Post gravid uterus with  scar within the   anterior lower uterine segment. Focal discontinuity is noted along the   anterior aspect of the  scar (4:74) along with subjacent fluid.   Multiple myometrial masses, likely leiomyomas. Unremarkable bilateral   adnexa.    BOWEL: No bowel obstruction. Small bowel wall thickening, likely   reactive. Appendix is not visualized. No evidence of inflammation in the   pericecal region.  PERITONEUM/RETROPERITONEUM: Since 2024, increased loculated ascites   with peripheral enhancement suggestive of peritonitis, likely   postoperative in etiology. No free air.  VESSELS: Within normal limits.  LYMPH NODES: No lymphadenopathy.  ABDOMINAL WALL: Postsurgical changes.  BONES: Within normal limits.    IMPRESSION:  1.  Postgravid uterus with  scar within the anterior lower   uterine segment. Focal discontinuity is noted along the anterior aspect   of the  scar along with subjacent fluid, suspicious for   dehiscence. There is subjacent fluid which appears contiguous with   loculated ascites with peripheral enhancement suspicious for abscess   formation and peritonitis. Largest pocket of loculated ascites anteriorly   measures approximately 13.3 cm.  2.  Small right pleural effusion with underlying passive atelectasis   right lower lobe.  3.  Ancillary findings as above.    Findings were discussed with Dr. TARYN TSAI [phone] 8/10/2024   3:52 AM by Dr. Hernandez with read back confirmation.    --- End of Report ---          RIGOBERTO BOLDEN MD; Resident Radiologist  This document has been electronically signed.  ANA HERNANDEZ MD; Attending Radiologist  This document has been electronically signed. Aug 10 2024  3:56AM    < end of copied text >

## 2024-08-10 NOTE — ED PROVIDER NOTE - PHYSICAL EXAMINATION
T(C): 36.9 (08-09-24 @ 23:47), Max: 36.9 (08-09-24 @ 23:47)  HR: 74 (08-09-24 @ 23:47) (74 - 74)  BP: 108/61 (08-09-24 @ 23:47) (108/61 - 108/61)  RR: 19 (08-09-24 @ 23:47) (19 - 19)  SpO2: 96% (08-09-24 @ 23:47) (96% - 96%)    CONSTITUTIONAL: Well groomed, no apparent distress  EYES:No conjunctival or scleral injection, non-icteric  ENMT: Oral mucosa with moist membranes. Normal dentition; s  GI: Soft, NT, abd consistent in size with recent pregnancy   SKIN: No rashes or ulcers noted;  PSYCH: Appropriate insight/judgment; A+O x 3, mood and affect appropriate, recent/remote memory intact

## 2024-08-10 NOTE — ED PROVIDER NOTE - CLINICAL SUMMARY MEDICAL DECISION MAKING FREE TEXT BOX
upper abd pain, no n/v/d. no fevers. no chest pain. tolerating po. gastritis vs biliary colic  -check labs  -US  -pepcid, tylenol, carafate

## 2024-08-10 NOTE — ED PROVIDER NOTE - NSFOLLOWUPINSTRUCTIONS_ED_ALL_ED_FT
Follow-up with Dr. Finley Monday     Delivery, Care After  The following information offers guidance on how to care for yourself after your procedure. Your health care provider may also give you more specific instructions. If you have problems or questions, contact your health care provider.    What can I expect after the procedure?  After the procedure, it is common to have:  A small amount of blood or clear fluid coming from the incision.  Some redness, swelling, and pain in your incision area.  Some abdominal pain and soreness.  Vaginal bleeding (lochia). Even though you did not have a vaginal delivery, you will still have vaginal bleeding and discharge.  Pelvic cramps.  Fatigue.  You may have pain, swelling, and discomfort in the tissue between your vagina and your anus (perineum) if:  Your  was unplanned, and you were allowed to labor and push.  An incision was made in the area (episiotomy) or the tissue tore during attempted vaginal delivery.  Follow these instructions at home:  Medicines    Take over-the-counter and prescription medicines only as told by your health care provider.  If you were prescribed an antibiotic medicine, take it as told by your health care provider. Do not stop taking the antibiotic even if you start to feel better.  Ask your health care provider if the medicine prescribed to you requires you to avoid driving or using machinery.    Incision care    Follow instructions from your health care provider about how to take care of your incision. Make sure you:  Wash your hands with soap and water for an least 20 seconds before and after you change your bandage (dressing). If soap and water are not available, use hand .  If you have a dressing, change it or remove it as told by your health care provider.  Leave stitches (sutures), skin staples, skin glue, or adhesive strips in place. These skin closures may need to stay in place for 2 weeks or longer. If adhesive strip edges start to loosen and curl up, you may trim the loose edges. Do not remove adhesive strips completely unless your health care provider tells you to do that.  Check your incision area every day for signs of infection. Check for:  More redness, swelling, or pain.  More fluid or blood.  Warmth.  Pus or a bad smell.  Do not take baths, swim, or use a hot tub until your health care provider approves. Ask your health care provider if you may take showers.  When you cough or sneeze, hug a pillow. This helps with pain and decreases the chance of your incision opening up (dehiscing). Do this until your incision heals.    Managing constipation    Your procedure may cause constipation. To prevent or treat constipation, you may need to:  Drink enough fluid to keep your urine pale yellow.  Take over-the-counter or prescription medicines.  Eat foods that are high in fiber, such as beans, whole grains, and fresh fruits and vegetables.  Limit foods that are high in fat and processed sugars, such as fried or sweet foods.    Activity    A sign showing that a person should not lift anything heavy.  If possible, have someone help you care for your baby and help with household activities for at least a few days after you leave the hospital.  Rest as much as possible. Try to rest or take a nap while your baby is sleeping.  You may have to avoid lifting. Ask your health care provider how much you can safely lift.  Return to your normal activities as told by your health care provider. Ask your health care provider what activities are safe for you.  Talk with your health care provider about when you can engage in sexual activity. This may depend on your:  Risk of infection.  How fast you heal.  Comfort and desire to engage in sexual activity.    Lifestyle    Do not drink alcohol. This is especially important if you are breastfeeding or taking pain medicine.  Do not use any products that contain nicotine or tobacco. These products include cigarettes, chewing tobacco, and vaping devices, such as e-cigarettes. If you need help quitting, ask your health care provider.    General instructions    Do not use tampons or douches until your health care provider approves.  Wear loose, comfortable clothing and a supportive and well-fitting bra.  If you pass a blood clot, save it and call your health care provider to discuss. Do not flush blood clots down the toilet before you get instructions from your health care provider.  Keep all follow-up visits for you and your baby. This is important.    Contact a health care provider if:  You have:  A fever.  Dizziness or light-headedness.  Bad-smelling vaginal discharge.  A blood clot pass from your vagina.  Pus, blood, or a bad smell coming from your incision.  An incision that feels warm to the touch.  More redness, swelling, or pain around your incision.  Difficulty or pain when urinating.  Nausea or vomiting.  Little or no interest in activities you used to enjoy.  Your breasts turn red or become painful or hard.  You feel unusually sad or worried.  You have questions about caring for yourself or your baby.  You have redness, swelling, and pain in an arm or leg.    Get help right away if:  You have:  Pain that does not go away or get better with medicine.  Chest pain.  Trouble breathing.  Blurred vision, spots, or flashing lights in your vision.  Thoughts about hurting yourself or your baby.  New pain in your abdomen or in one of your legs.  A severe headache that does not get better with pain medicine.  You faint.  You bleed from your vagina so much that you fill more than one sanitary pad in one hour. Bleeding should not be heavier than your heaviest period.  These symptoms may be an emergency. Get help right away. Call 911.  Do not wait to see if the symptoms will go away.  Do not drive yourself to the hospital.  Get help right away if you feel like you may hurt yourself or others, or have thoughts about taking your own life. Go to your nearest emergency room or:  Call 911.  Call the National Suicide Prevention Lifeline at 1-483.334.6692 or 573. This is open 24 hours a day.  Text the Crisis Text Line at 490999.    Summary  After the procedure, it is common to have pain at your incision site, abdominal cramping, and slight bleeding from your vagina.  Check your incision area every day for signs of infection.  Tell your health care provider about any unusual symptoms.  Keep all follow-up visits for you and your baby. This is important.  This information is not intended to replace advice given to you by your health care provider. Make sure you discuss any questions you have with your health care provider.        Foods to limit or avoid: You may need to avoid acidic, spicy, or high-fat foods. Not all foods affect everyone the same way. You will need to learn which foods worsen your symptoms and limit those foods. The following are some foods that may worsen ulcer or gastritis symptoms:    Beverages:  Whole milk and chocolate milk    Hot cocoa and cola    Any beverage with caffeine    Regular and decaffeinated coffee    Peppermint and spearmint tea    Green and black tea, with or without caffeine    Orange and grapefruit juices    Drinks that contain alcohol    Spices and seasonings:  Black and red pepper    Chili powder    Mustard seed and nutmeg    Other foods:  Dairy foods made from whole milk or cream    Chocolate    Spicy or strongly flavored cheeses, such as jalapeno or black pepper    Highly seasoned, high-fat meats, such as sausage, salami, aranda, ham, and cold cuts    Hot chiles and peppers    Tomato products, such as tomato paste, tomato sauce, or tomato juice  Foods to include: Eat a variety of healthy foods from all the food groups. Eat fruits, vegetables, whole grains, and fat-free or low-fat dairy foods. Whole grains include whole-wheat breads, cereals, pasta, and brown rice. Choose lean meats, poultry (chicken and turkey), fish, beans, eggs, and nuts. A healthy meal plan is low in unhealthy fats, salt, and added sugar. Healthy fats include olive oil and canola oil. Ask your dietitian for more information about a healthy diet.    Other helpful guidelines:    Do not eat right before bedtime. Stop eating at least 2 hours before bedtime.    Eat small, frequent meals. Your stomach may tolerate small, frequent meals better than large meals.

## 2024-08-10 NOTE — ED PROVIDER NOTE - OBJECTIVE STATEMENT
Pt is a 33 y/o F hx of post partum preeclampsia, 11 days post op c section presents with 3 days of severe intermittent abdominal pain. Pt reports the pain as spasmodic and episodic, states that the pain is 10/10 when it happens but 2/10 at baseline, and episodes have been happening about 20x per day since they began 3 days ago. Pt reports the pain is in the mid abdomen with no radiation, tylenol does not improve the pain, and nothing in particular makes it worse. Pt had an episode of post partum preeclampsia last week, resolved with medication, pt is currently on nifedipine and labetalol, with no repeat episodes of high blood pressure. Pt reports no headache, no blurry vision, no dizziness, no nausea, no vomiting, no bloody stool, and a normal BM today. Pt is a 33 y/o F hx of post partum preeclampsia, 11 days post op c section (indicated for cholestasis of pregnancy) presents with 3 days of severe intermittent abdominal pain. Pt reports the pain as spasmodic and episodic, states that the pain is 10/10 when it happens but 2/10 at baseline, and episodes have been happening about 20x per day since they began 3 days ago. Pt reports the pain is in the mid abdomen with no radiation, tylenol does not improve the pain, and nothing in particular makes it worse. Pt had an episode of post partum preeclampsia last week, resolved with medication, pt is currently on nifedipine and labetalol, with no repeat episodes of high blood pressure. Pt reports no headache, no blurry vision, no dizziness, no nausea, no vomiting, no bloody stool, and a normal BM today.

## 2024-08-10 NOTE — CONSULT NOTE ADULT - ASSESSMENT
33yo  POD11 from primary CS for failed IOL c/b postop ileus (managed conservatively) and superimposed preeclampsia with severe features (s/p IV magnesium x24h) presents to ED with epigastric pain for r/o recurrent superimposed PEC w/ SF.  - Hemodynamically stable, afebrile  - BP normotensive here  - Obtain CBC, CMP, LDH, UrA  - Investigate other etiologies for epigastric pain, agree with serum lipase and consider RUQ US    - continue labetalol 400 TID, nifedipine 30mg qd   - F/u with OB per routine    Delta Prabhakar, PGY2  D/W Dr. Jennings PGY4    **recommendations not final** 33yo  POD11 from primary CS for failed IOL c/b postop ileus (managed conservatively) and superimposed preeclampsia with severe features (s/p IV magnesium x24h) presents to ED with epigastric pain for r/o recurrent superimposed PEC w/ SF.  - Hemodynamically stable, afebrile  - BP normotensive here  - Obtain CBC, CMP, LDH, UrA - all wnl  - Not meeting criteria for recurrent superimposed preeclampsia with severe features  - continue labetalol 400 TID, nifedipine 30mg qd   - F/u with OB per routine  - Investigate other etiologies for epigastric pain, agree with serum lipase and consider RUQ US    Delta Prabhakar, PGY2  D/W Dr. Jennings PGY4 and Dr. Silva attending OBGYN physician    **recommendations not final** 33yo  POD11 from primary CS for failed IOL c/b postop ileus (managed conservatively) and superimposed preeclampsia with severe features (s/p IV magnesium x24h) presents to ED with epigastric pain for r/o recurrent superimposed PEC w/ SF.  - Hemodynamically stable, afebrile  - BP normotensive here  - give Tums, pepcid, pantoprazole for possible reflux/heartburn  - Obtain CBC, CMP, LDH, UrA - all wnl  - Not meeting criteria for recurrent superimposed preeclampsia with severe features  - continue labetalol 400 TID, nifedipine 30mg qd   - F/u with OB per routine  - Investigate other etiologies for epigastric pain, agree with serum lipase and consider RUQ US    Delta Prabhakar, PGY2  D/W Dr. Jennings PGY4 and Dr. Silva attending OBGYN physician    **recommendations not final** 35yo  POD11 from primary CS for failed IOL c/b postop ileus (managed conservatively) and superimposed preeclampsia with severe features (s/p IV magnesium x24h) presents to ED with epigastric pain for r/o recurrent superimposed PEC w/ SF.  - Hemodynamically stable, afebrile  - BP normotensive here  - give Tums, pepcid, pantoprazole for possible reflux/heartburn  - Obtain CBC, CMP, LDH, UrA - all wnl  - Not meeting criteria for recurrent superimposed preeclampsia with severe features  - continue labetalol 400 TID, nifedipine 30mg qd   - F/u with OB per routine  - Investigate other etiologies for epigastric pain, agree with serum lipase and consider RUQ US  - RUQ US shows no biliary pathology, does comment on 12.3cm complex cystic structure in LLQ  - f/u CTAP w IV contrast    Delta Prabhakar, PGY2  D/W Dr. Jennings PGY4 and Dr. Silva attending OBGYN physician    **recommendations not final** 33yo  POD11 from primary CS for failed IOL c/b postop ileus (managed conservatively) and superimposed preeclampsia with severe features (s/p IV magnesium x24h) presents to ED with epigastric pain for r/o recurrent superimposed PEC w/ SF.  #Rule out recurrent superimposed preeclampsia with severe features  - Hemodynamically stable, afebrile  - BP normotensive here  - give Tums, pepcid, pantoprazole for possible reflux/heartburn  - Obtain CBC, CMP, LDH, UrA - all wnl, no transaminitis  - Not meeting criteria for recurrent superimposed preeclampsia with severe features  - continue labetalol 400 TID, nifedipine 30mg qd   - Investigate other etiologies for epigastric pain, agree with serum lipase and consider RUQ US  - RUQ US shows no biliary pathology, does comment on 12.3cm complex cystic structure in LLQ    #Pelvic fluid collection  - f/u CTAP w IV contrast: fluid collection in the LLQ- loculated ascites with peripheral enhancement suspicious for abscess formation and peritonitis. Largest pocket of loculated ascites anteriorly measures approximately 13.3 cm  - Pt reevaluated and is in 0/10 pain, states her epigastric pain resolved after dose of toradol. Patient expresses desire to go home   - Given no leukocytosis, no fever, normal vitals, no abdominal pain the lower quadrants, no rebound or guarding, no clinical signs of peritonitis there is no indication for acute intervention at this time.   - Pt to follow up on Monday with primary OB Dr. Finley to reevaluate pain and discuss management of incidental finding of pelvic fluid collection in the absence of infectious sign/symptoms including possible IR drainage  - Pt given strict return precautions including fever/chills, T>100.4, new/worsening abdominal pain, nausea/vomiting   - Pt in understanding and agreement with plan   - All questions answered     Delta Prabhakar, PGY2  D/W Dr. Jennings PGY4 and Dr. Silva, Dr. Finley attending OBGYN physicians

## 2024-08-10 NOTE — ED PROVIDER NOTE - ATTENDING CONTRIBUTION TO CARE
34F s/p  , c/o upper abd pain. pt states pain has been worsening since coming home. pt was induced at 37wks for cholestasis of pregnancy, preeclampsia, htn. pt discharged on nifedipine and labetalol. no n/v. no fevers. no dysuria. tolerating po  gen- nad  heent- ncat, clear conj  cv -rrr  lungs -ctab  abd - soft, epigastric ttp, distention  ext -wwp, no edema  neuro -aox3, steady gait, valenzuela  upper abd pain, no n/v. no fevers. pending US

## 2024-08-10 NOTE — ED PROVIDER NOTE - CARE PLAN
1 Principal Discharge DX:	Abdominal pain  Secondary Diagnosis:	Status post  section  Secondary Diagnosis:	Peritoneal fluid collection

## 2024-08-11 LAB
CULTURE RESULTS: SIGNIFICANT CHANGE UP
SPECIMEN SOURCE: SIGNIFICANT CHANGE UP

## 2024-08-14 ENCOUNTER — NON-APPOINTMENT (OUTPATIENT)
Age: 34
End: 2024-08-14

## 2024-08-14 ENCOUNTER — APPOINTMENT (OUTPATIENT)
Dept: OBGYN | Facility: CLINIC | Age: 34
End: 2024-08-14

## 2024-08-15 ENCOUNTER — TRANSCRIPTION ENCOUNTER (OUTPATIENT)
Age: 34
End: 2024-08-15

## 2024-08-16 ENCOUNTER — NON-APPOINTMENT (OUTPATIENT)
Age: 34
End: 2024-08-16

## 2024-08-23 ENCOUNTER — OUTPATIENT (OUTPATIENT)
Dept: OUTPATIENT SERVICES | Facility: HOSPITAL | Age: 34
LOS: 1 days | End: 2024-08-23

## 2024-08-23 ENCOUNTER — APPOINTMENT (OUTPATIENT)
Dept: INTERVENTIONAL RADIOLOGY/VASCULAR | Facility: HOSPITAL | Age: 34
End: 2024-08-23

## 2024-08-23 ENCOUNTER — RESULT REVIEW (OUTPATIENT)
Age: 34
End: 2024-08-23

## 2024-08-23 DIAGNOSIS — Z98.891 HISTORY OF UTERINE SCAR FROM PREVIOUS SURGERY: Chronic | ICD-10-CM

## 2024-08-23 PROCEDURE — 76080 X-RAY EXAM OF FISTULA: CPT

## 2024-08-23 PROCEDURE — 76080 X-RAY EXAM OF FISTULA: CPT | Mod: 26

## 2024-08-23 PROCEDURE — 49424 ASSESS CYST CONTRAST INJECT: CPT

## 2024-08-26 ENCOUNTER — NON-APPOINTMENT (OUTPATIENT)
Age: 34
End: 2024-08-26

## 2024-09-13 ENCOUNTER — APPOINTMENT (OUTPATIENT)
Dept: OBGYN | Facility: CLINIC | Age: 34
End: 2024-09-13

## 2024-09-13 VITALS — HEART RATE: 75 BPM | SYSTOLIC BLOOD PRESSURE: 166 MMHG | OXYGEN SATURATION: 96 % | DIASTOLIC BLOOD PRESSURE: 103 MMHG

## 2024-09-13 VITALS — DIASTOLIC BLOOD PRESSURE: 97 MMHG | SYSTOLIC BLOOD PRESSURE: 158 MMHG

## 2024-09-13 DIAGNOSIS — R30.0 DYSURIA: ICD-10-CM

## 2024-09-13 PROCEDURE — 0503F POSTPARTUM CARE VISIT: CPT

## 2024-09-13 RX ORDER — NIFEDIPINE 30 MG/1
30 TABLET, EXTENDED RELEASE ORAL
Qty: 60 | Refills: 2 | Status: ACTIVE | COMMUNITY
Start: 2024-09-13 | End: 1900-01-01

## 2024-09-13 NOTE — HISTORY OF PRESENT ILLNESS
[Delivery Date: ___] : on [unfilled] [Male] : Delivery History: baby boy [Breastfeeding] : currently nursing [BF with Difficulty] : nursing with difficulty [Primary C/S] : delivered by  section [Resumed Menses] : has not resumed her menses [Resumed [place]] : has not resumed intercourse [Intended Contraception] : Intended Contraception: [IUD] : intrauterine device [FreeTextEntry8] : Follow up six weeks post-partum exam. [FreeTextEntry1] : 33 yo  HD3/POD15 s/p primary CS + myomectomy for failed IOL for chronic HTN c/b postoperative ileus (managed conservatively) and superimposed PEC w/ SF (s/p IV magnesium x24h) p/w epigastric pain, found to have 13cm loculated pelvic collection, s/p IR drainage on .  nifed 30 and labetalol 400 TID considering IUD  follow up in 6 months for annual exam

## 2024-09-14 ENCOUNTER — TRANSCRIPTION ENCOUNTER (OUTPATIENT)
Age: 34
End: 2024-09-14

## 2024-09-14 LAB
APPEARANCE: CLEAR
BILIRUBIN URINE: NEGATIVE
BLOOD URINE: NEGATIVE
COLOR: YELLOW
GLUCOSE QUALITATIVE U: NEGATIVE MG/DL
KETONES URINE: NEGATIVE MG/DL
LEUKOCYTE ESTERASE URINE: NEGATIVE
NITRITE URINE: NEGATIVE
PH URINE: 6.5
PROTEIN URINE: NEGATIVE MG/DL
SPECIFIC GRAVITY URINE: 1.01
UROBILINOGEN URINE: 0.2 MG/DL

## 2024-09-16 ENCOUNTER — NON-APPOINTMENT (OUTPATIENT)
Age: 34
End: 2024-09-16

## 2024-09-18 ENCOUNTER — APPOINTMENT (OUTPATIENT)
Age: 34
End: 2024-09-18
Payer: COMMERCIAL

## 2024-09-18 PROCEDURE — S9443: CPT | Mod: 95

## 2024-09-23 ENCOUNTER — TRANSCRIPTION ENCOUNTER (OUTPATIENT)
Age: 34
End: 2024-09-23

## 2024-11-20 ENCOUNTER — RX RENEWAL (OUTPATIENT)
Age: 34
End: 2024-11-20

## 2025-01-09 ENCOUNTER — APPOINTMENT (OUTPATIENT)
Age: 35
End: 2025-01-09
Payer: COMMERCIAL

## 2025-01-09 ENCOUNTER — NON-APPOINTMENT (OUTPATIENT)
Age: 35
End: 2025-01-09

## 2025-01-09 VITALS
BODY MASS INDEX: 30.05 KG/M2 | WEIGHT: 187 LBS | HEIGHT: 66 IN | SYSTOLIC BLOOD PRESSURE: 132 MMHG | DIASTOLIC BLOOD PRESSURE: 78 MMHG | TEMPERATURE: 97.3 F | HEART RATE: 73 BPM | OXYGEN SATURATION: 99 % | RESPIRATION RATE: 14 BRPM

## 2025-01-09 VITALS — DIASTOLIC BLOOD PRESSURE: 79 MMHG | HEART RATE: 75 BPM | SYSTOLIC BLOOD PRESSURE: 138 MMHG

## 2025-01-09 DIAGNOSIS — R51.9 HEADACHE, UNSPECIFIED: ICD-10-CM

## 2025-01-09 DIAGNOSIS — M65.4 RADIAL STYLOID TENOSYNOVITIS [DE QUERVAIN]: ICD-10-CM

## 2025-01-09 DIAGNOSIS — I10 ESSENTIAL (PRIMARY) HYPERTENSION: ICD-10-CM

## 2025-01-09 DIAGNOSIS — H53.9 UNSPECIFIED VISUAL DISTURBANCE: ICD-10-CM

## 2025-01-09 DIAGNOSIS — Z78.9 OTHER SPECIFIED HEALTH STATUS: ICD-10-CM

## 2025-01-09 PROCEDURE — 36415 COLL VENOUS BLD VENIPUNCTURE: CPT

## 2025-01-09 PROCEDURE — 99214 OFFICE O/P EST MOD 30 MIN: CPT

## 2025-01-09 PROCEDURE — G2211 COMPLEX E/M VISIT ADD ON: CPT | Mod: NC

## 2025-01-09 RX ORDER — DICLOFENAC SODIUM 10 MG/G
1 GEL TOPICAL
Qty: 1 | Refills: 0 | Status: ACTIVE | COMMUNITY
Start: 2025-01-09 | End: 1900-01-01

## 2025-01-10 PROBLEM — Z78.9 BREASTFEEDING (INFANT): Status: ACTIVE | Noted: 2025-01-10

## 2025-01-10 LAB
25(OH)D3 SERPL-MCNC: 36.3 NG/ML
ALBUMIN SERPL ELPH-MCNC: 4.7 G/DL
ALP BLD-CCNC: 74 U/L
ALT SERPL-CCNC: 14 U/L
ANION GAP SERPL CALC-SCNC: 12 MMOL/L
APPEARANCE: CLEAR
AST SERPL-CCNC: 14 U/L
BACTERIA: ABNORMAL /HPF
BASOPHILS # BLD AUTO: 0.07 K/UL
BASOPHILS NFR BLD AUTO: 1.5 %
BILIRUB SERPL-MCNC: <0.2 MG/DL
BILIRUBIN URINE: NEGATIVE
BLOOD URINE: NEGATIVE
BUN SERPL-MCNC: 13 MG/DL
CALCIUM SERPL-MCNC: 9.9 MG/DL
CAST: 1 /LPF
CHLORIDE SERPL-SCNC: 106 MMOL/L
CO2 SERPL-SCNC: 23 MMOL/L
COLOR: YELLOW
CREAT SERPL-MCNC: 0.75 MG/DL
CRP SERPL-MCNC: <3 MG/L
EGFR: 106 ML/MIN/1.73M2
EOSINOPHIL # BLD AUTO: 0.12 K/UL
EOSINOPHIL NFR BLD AUTO: 2.6 %
EPITHELIAL CELLS: 4 /HPF
ERYTHROCYTE [SEDIMENTATION RATE] IN BLOOD BY WESTERGREN METHOD: 2 MM/HR
FERRITIN SERPL-MCNC: 24 NG/ML
GLUCOSE QUALITATIVE U: NEGATIVE MG/DL
GLUCOSE SERPL-MCNC: 89 MG/DL
HCT VFR BLD CALC: 38.7 %
HGB BLD-MCNC: 12.8 G/DL
IMM GRANULOCYTES NFR BLD AUTO: 0.2 %
IRON SATN MFR SERPL: 20 %
IRON SERPL-MCNC: 76 UG/DL
KETONES URINE: NEGATIVE MG/DL
LEUKOCYTE ESTERASE URINE: NEGATIVE
LYMPHOCYTES # BLD AUTO: 1.38 K/UL
LYMPHOCYTES NFR BLD AUTO: 29.9 %
MAN DIFF?: NORMAL
MCHC RBC-ENTMCNC: 28.6 PG
MCHC RBC-ENTMCNC: 33.1 G/DL
MCV RBC AUTO: 86.4 FL
MICROSCOPIC-UA: NORMAL
MONOCYTES # BLD AUTO: 0.4 K/UL
MONOCYTES NFR BLD AUTO: 8.7 %
NEUTROPHILS # BLD AUTO: 2.64 K/UL
NEUTROPHILS NFR BLD AUTO: 57.1 %
NITRITE URINE: NEGATIVE
PH URINE: 7.5
PLATELET # BLD AUTO: 273 K/UL
POTASSIUM SERPL-SCNC: 4.4 MMOL/L
PROLACTIN SERPL-MCNC: 63.2 NG/ML
PROT SERPL-MCNC: 6.7 G/DL
PROTEIN URINE: NORMAL MG/DL
RBC # BLD: 4.48 M/UL
RBC # FLD: 14.3 %
RED BLOOD CELLS URINE: 1 /HPF
SODIUM SERPL-SCNC: 141 MMOL/L
SPECIFIC GRAVITY URINE: 1.03
TIBC SERPL-MCNC: 390 UG/DL
TSH SERPL-ACNC: 1.23 UIU/ML
UIBC SERPL-MCNC: 313 UG/DL
UROBILINOGEN URINE: 0.2 MG/DL
VIT B12 SERPL-MCNC: 1054 PG/ML
WBC # FLD AUTO: 4.62 K/UL
WHITE BLOOD CELLS URINE: 0 /HPF

## 2025-01-10 NOTE — ASSESSMENT
[FreeTextEntry1] : 35 y/o female presents c/o left wrist/thumb pain. She also c/o headache associated dizziness and vision change like static. Hx of migraines but states these headaches feel different, are more severe and last for sometimes hours.

## 2025-01-10 NOTE — ASSESSMENT
[FreeTextEntry1] : 33 y/o female presents c/o left wrist/thumb pain. She also c/o headache associated dizziness and vision change like static. Hx of migraines but states these headaches feel different, are more severe and last for sometimes hours.

## 2025-01-10 NOTE — HISTORY OF PRESENT ILLNESS
[FreeTextEntry8] : 35 y/o female presents c/o left wrist/thumb pain. She also c/o headache associated dizziness and vision change like static. Hx of migraines but states these headaches feel different, are more severe and last for sometimes hours. Pt delivered 2024 via  due to failed induction. Hx of preeclampsia and SVT in pregnancy.  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 Using Motrin without resolution, splint also without improvement. For preeclampsia and HTN management patient reports taking labetalol 400 mg TID and was on nifedipine but nifedipine discontinued and she tapered labetalol to 200 mg BID.  Home BP- 116- / 70-80s

## 2025-01-10 NOTE — PLAN
[FreeTextEntry1] :  #Headache -MRI head -Neurology consult -Prolactin, CBC w. diff, CMP, iron + tibc, ferritin, TSH, vitamin D, CRP, ESR  #HTN -Ophthalmology consult  #Dizziness -EKG- low voltage, sinus rhythm -Cardiology consult  #Tenosynovitis -Diclofenac gel -PT  Labs drawn in office

## 2025-01-10 NOTE — PHYSICAL EXAM
[No Acute Distress] : no acute distress [Well Developed] : well developed [Well-Appearing] : well-appearing [Normal Sclera/Conjunctiva] : normal sclera/conjunctiva [EOMI] : extraocular movements intact [No Lymphadenopathy] : no lymphadenopathy [Supple] : supple [No Respiratory Distress] : no respiratory distress  [No Accessory Muscle Use] : no accessory muscle use [Clear to Auscultation] : lungs were clear to auscultation bilaterally [Normal Rate] : normal rate  [Regular Rhythm] : with a regular rhythm [Normal S1, S2] : normal S1 and S2 [Grossly Normal Strength/Tone] : grossly normal strength/tone [No Focal Deficits] : no focal deficits [Normal Affect] : the affect was normal [Normal Insight/Judgement] : insight and judgment were intact [PERRL] : pupils equal round and reactive to light

## 2025-01-11 ENCOUNTER — NON-APPOINTMENT (OUTPATIENT)
Age: 35
End: 2025-01-11

## 2025-01-13 ENCOUNTER — APPOINTMENT (OUTPATIENT)
Age: 35
End: 2025-01-13
Payer: COMMERCIAL

## 2025-01-13 VITALS
HEART RATE: 66 BPM | DIASTOLIC BLOOD PRESSURE: 65 MMHG | SYSTOLIC BLOOD PRESSURE: 121 MMHG | HEIGHT: 66 IN | WEIGHT: 187 LBS | BODY MASS INDEX: 30.05 KG/M2 | RESPIRATION RATE: 16 BRPM | OXYGEN SATURATION: 99 % | TEMPERATURE: 97 F

## 2025-01-13 DIAGNOSIS — I10 ESSENTIAL (PRIMARY) HYPERTENSION: ICD-10-CM

## 2025-01-13 DIAGNOSIS — R42 DIZZINESS AND GIDDINESS: ICD-10-CM

## 2025-01-13 DIAGNOSIS — I47.10 SUPRAVENTRICULAR TACHYCARDIA, UNSPECIFIED: ICD-10-CM

## 2025-01-13 DIAGNOSIS — R00.2 PALPITATIONS: ICD-10-CM

## 2025-01-13 PROCEDURE — 93010 ELECTROCARDIOGRAM REPORT: CPT

## 2025-01-13 PROCEDURE — 93306 TTE W/DOPPLER COMPLETE: CPT | Mod: 59

## 2025-01-13 PROCEDURE — 99204 OFFICE O/P NEW MOD 45 MIN: CPT | Mod: 25

## 2025-01-13 NOTE — ASSESSMENT
[FreeTextEntry1] : KEITH HOUSE is a 34 year F with past medical history significant for HTN, PSVT. She is here for cardiac evaluation of "skipping beats", dizziness spells/fatigue, as described above.   - I reviewed ECG - I reviewed labs - Schedule an echo to evaluate for structural heart disease - place a holter x 1 mo.  - continue taking cardiac meds  - check lipids at next blood draw - Increase ambulation as tolerated to aim for approximately 30 minutes of moderate activity 5 days a week.  Heart healthy diet low in sodium, sugars and saturated fats and high in fruits, vegetables and whole grains.  Weight loss. - stress management.   Patient advised to go to the nearest ED whenever any symptoms persist and/or worsens.  Patient/Family/Caregiver verbalized complete understanding of these instructions.  I spent a total of 45 minutes with more than 50% spent on counseling and coordinating care.   RTO after test results are available.

## 2025-01-13 NOTE — HISTORY OF PRESENT ILLNESS
[FreeTextEntry1] : KEITH HOUSE is a 34 year y.o. F with medical history significant for HTN (dx 2021), preeclampsia (7/2024), PSVT. She is here for cardiac evaluation and BP control.   She reports feeling like her "heart is skipping a beat" x 3 mo. Occurs ~1x/wk. A.w LH and feeling like she is going to faint. Lasts up to 1 min.  She reports having episodes of headache a.w LH/dizziness. Occurs ~1x/wk. Lasts about 1 min. She feels like she is going to faint. No syncope.  She has a referral to see neuro  Denies CP, SOB, orthopnea, syncope, BAUMAN, edema Patient denies changes in her exercise tolerance during the past 6 months. She can walk 10 blocks and can climb 3 flights of stairs.  Sleeps with 1 pillow  She denies history of MI, CVA, DM, smoking Denies family history of premature ASCVD and /or SCD  No hospitalizations in the past 3 years.    DATA ECG (1/9/25): NSR at 63 bpm w nl axis, low voltage and no STT abn   LABS (1/9/25): K 4.4; Cre 0.75; eGFR 106; LFTs wnl; TSH 1.23; CRP <3; Hgb 12.8; Hct 38.7; Plt 273; ESR 2;

## 2025-01-13 NOTE — PHYSICAL EXAM
[Well Developed] : well developed [Well Nourished] : well nourished [No Acute Distress] : no acute distress [Normal Venous Pressure] : normal venous pressure [No Carotid Bruit] : no carotid bruit [Normal S1, S2] : normal S1, S2 [No Murmur] : no murmur [No Rub] : no rub [No Gallop] : no gallop [Clear Lung Fields] : clear lung fields [Good Air Entry] : good air entry [No Respiratory Distress] : no respiratory distress  [Normal Gait] : normal gait [No Edema] : no edema [No Cyanosis] : no cyanosis [No Clubbing] : no clubbing [No Rash] : no rash [Moves all extremities] : moves all extremities [No Focal Deficits] : no focal deficits [Normal Speech] : normal speech [Alert and Oriented] : alert and oriented [Normal memory] : normal memory

## 2025-01-22 ENCOUNTER — NON-APPOINTMENT (OUTPATIENT)
Age: 35
End: 2025-01-22

## 2025-01-25 NOTE — REVIEW OF SYSTEMS
Patient: Amy Wheeler    Feeling Fatigued] : feeling fatigued [Palpitations] : palpitations [Dizziness] : dizziness [Depression] : depression [Fever] : no fever [Headache] : no headache [Chills] : no chills [SOB] : no shortness of breath [Dyspnea on exertion] : not dyspnea during exertion [Chest Discomfort] : no chest discomfort [Lower Ext Edema] : no extremity edema [Leg Claudication] : no intermittent leg claudication [Orthopnea] : no orthopnea [PND] : no PND [Syncope] : no syncope [Convulsions] : no convulsions [Tingling (Paresthesia)] : no tingling [Weakness] : no weakness [Limb Weakness (Paresis)] : no limb weakness (Paresis) [Speech Disturbance] : no speech disturbance [Confusion] : no confusion was observed [Anxiety] : no anxiety [Under Stress] : not under stress [Suicidal] : not suicidal [Easy Bleeding] : no tendency for easy bleeding

## 2025-04-02 ENCOUNTER — TRANSCRIPTION ENCOUNTER (OUTPATIENT)
Age: 35
End: 2025-04-02

## 2025-06-19 NOTE — OB RN TRIAGE NOTE - FALL HARM RISK - FALLEN IN PAST
Your exam is consistent with infection of outer auricle of ear. Oral antibiotics were sent to your pharmacy. You do also have some flaking and irritation to the ear canal, antibiotic drops were also sent.   Your skin lesion is consistent with ring worm, an antifungal cream was sent  Please follow up with your doctor if not resolving in the next week with treatment as expected    No

## 2025-07-24 ENCOUNTER — TRANSCRIPTION ENCOUNTER (OUTPATIENT)
Age: 35
End: 2025-07-24

## 2025-08-19 ENCOUNTER — TRANSCRIPTION ENCOUNTER (OUTPATIENT)
Age: 35
End: 2025-08-19